# Patient Record
Sex: FEMALE | Race: WHITE | NOT HISPANIC OR LATINO | Employment: UNEMPLOYED | URBAN - METROPOLITAN AREA
[De-identification: names, ages, dates, MRNs, and addresses within clinical notes are randomized per-mention and may not be internally consistent; named-entity substitution may affect disease eponyms.]

---

## 2023-02-17 PROBLEM — M62.81 MUSCLE WEAKNESS OF EXTREMITY: Status: ACTIVE | Noted: 2023-02-17

## 2023-02-17 PROBLEM — J40 BRONCHITIS: Status: ACTIVE | Noted: 2023-02-17

## 2023-02-17 PROBLEM — M25.50 ARTHRALGIA: Status: ACTIVE | Noted: 2023-02-17

## 2023-02-17 PROBLEM — M54.50 CHRONIC LOW BACK PAIN: Status: ACTIVE | Noted: 2023-02-17

## 2023-02-17 PROBLEM — M15.9 PRIMARY OSTEOARTHRITIS INVOLVING MULTIPLE JOINTS: Status: ACTIVE | Noted: 2023-02-17

## 2023-02-17 PROBLEM — M25.579 ANKLE PAIN: Status: ACTIVE | Noted: 2023-02-17

## 2023-02-17 PROBLEM — C56.9 OVARIAN CANCER (MULTI): Status: ACTIVE | Noted: 2023-02-17

## 2023-02-17 PROBLEM — M79.606 LEG PAIN: Status: ACTIVE | Noted: 2023-02-17

## 2023-02-17 PROBLEM — R76.8 POSITIVE ANTINUCLEAR ANTIBODY: Status: ACTIVE | Noted: 2023-02-17

## 2023-02-17 PROBLEM — F11.90 CHRONIC, CONTINUOUS USE OF OPIOIDS: Status: ACTIVE | Noted: 2023-02-17

## 2023-02-17 PROBLEM — L98.9 BUMPS ON SKIN: Status: ACTIVE | Noted: 2023-02-17

## 2023-02-17 PROBLEM — E78.5 HYPERLIPIDEMIA: Status: ACTIVE | Noted: 2023-02-17

## 2023-02-17 PROBLEM — M15.0 PRIMARY OSTEOARTHRITIS INVOLVING MULTIPLE JOINTS: Status: ACTIVE | Noted: 2023-02-17

## 2023-02-17 PROBLEM — M79.671 PAIN OF BOTH HEELS: Status: ACTIVE | Noted: 2023-02-17

## 2023-02-17 PROBLEM — M79.641 PAIN IN BOTH HANDS: Status: ACTIVE | Noted: 2023-02-17

## 2023-02-17 PROBLEM — M77.01 MEDIAL EPICONDYLITIS OF RIGHT ELBOW: Status: ACTIVE | Noted: 2023-02-17

## 2023-02-17 PROBLEM — G89.29 CHRONIC LOW BACK PAIN: Status: ACTIVE | Noted: 2023-02-17

## 2023-02-17 PROBLEM — M79.645 PAIN OF LEFT THUMB: Status: ACTIVE | Noted: 2023-02-17

## 2023-02-17 PROBLEM — M79.642 PAIN IN BOTH HANDS: Status: ACTIVE | Noted: 2023-02-17

## 2023-02-17 PROBLEM — M25.531 RIGHT WRIST PAIN: Status: ACTIVE | Noted: 2023-02-17

## 2023-02-17 PROBLEM — M79.672 PAIN OF BOTH HEELS: Status: ACTIVE | Noted: 2023-02-17

## 2023-02-17 PROBLEM — M25.521 RIGHT ELBOW PAIN: Status: ACTIVE | Noted: 2023-02-17

## 2023-02-17 RX ORDER — EXEMESTANE 25 MG/1
TABLET ORAL
COMMUNITY

## 2023-02-17 RX ORDER — OXYCODONE HYDROCHLORIDE 10 MG/1
10 TABLET ORAL EVERY 8 HOURS PRN
COMMUNITY
Start: 2022-01-21 | End: 2023-03-09 | Stop reason: SDUPTHER

## 2023-02-17 RX ORDER — GABAPENTIN 300 MG/1
300 CAPSULE ORAL
COMMUNITY
Start: 2022-01-21 | End: 2023-03-09 | Stop reason: SDUPTHER

## 2023-02-17 RX ORDER — ONDANSETRON HYDROCHLORIDE 8 MG/1
TABLET, FILM COATED ORAL
COMMUNITY

## 2023-02-17 RX ORDER — TRAZODONE HYDROCHLORIDE 50 MG/1
1-2 TABLET ORAL NIGHTLY
COMMUNITY
End: 2023-03-24 | Stop reason: SDUPTHER

## 2023-02-19 PROBLEM — F51.01 PRIMARY INSOMNIA: Status: ACTIVE | Noted: 2023-02-19

## 2023-03-08 ENCOUNTER — CLINICAL SUPPORT (OUTPATIENT)
Dept: PRIMARY CARE | Facility: CLINIC | Age: 53
End: 2023-03-08
Payer: COMMERCIAL

## 2023-03-08 DIAGNOSIS — G89.29 CHRONIC LOW BACK PAIN, UNSPECIFIED BACK PAIN LATERALITY, UNSPECIFIED WHETHER SCIATICA PRESENT: ICD-10-CM

## 2023-03-08 DIAGNOSIS — M79.606 PAIN OF LOWER EXTREMITY, UNSPECIFIED LATERALITY: ICD-10-CM

## 2023-03-08 DIAGNOSIS — M54.50 CHRONIC LOW BACK PAIN, UNSPECIFIED BACK PAIN LATERALITY, UNSPECIFIED WHETHER SCIATICA PRESENT: ICD-10-CM

## 2023-03-08 PROCEDURE — 80373 DRUG SCREENING TRAMADOL: CPT

## 2023-03-08 PROCEDURE — 80307 DRUG TEST PRSMV CHEM ANLYZR: CPT

## 2023-03-08 PROCEDURE — 80358 DRUG SCREENING METHADONE: CPT

## 2023-03-08 PROCEDURE — 80355 GABAPENTIN NON-BLOOD: CPT

## 2023-03-08 PROCEDURE — 80346 BENZODIAZEPINES1-12: CPT

## 2023-03-08 PROCEDURE — 80361 OPIATES 1 OR MORE: CPT

## 2023-03-08 PROCEDURE — 80354 DRUG SCREENING FENTANYL: CPT

## 2023-03-08 PROCEDURE — 80368 SEDATIVE HYPNOTICS: CPT

## 2023-03-08 PROCEDURE — 80365 DRUG SCREENING OXYCODONE: CPT

## 2023-03-08 RX ORDER — OXYCODONE HYDROCHLORIDE 10 MG/1
10 TABLET ORAL EVERY 8 HOURS PRN
Qty: 90 TABLET | Refills: 0 | Status: CANCELLED | OUTPATIENT
Start: 2023-03-08 | End: 2023-04-07

## 2023-03-09 DIAGNOSIS — M15.9 PRIMARY OSTEOARTHRITIS INVOLVING MULTIPLE JOINTS: ICD-10-CM

## 2023-03-09 RX ORDER — OXYCODONE HYDROCHLORIDE 10 MG/1
10 TABLET ORAL EVERY 8 HOURS PRN
Qty: 90 TABLET | Refills: 0 | Status: CANCELLED | OUTPATIENT
Start: 2023-03-09

## 2023-03-09 RX ORDER — GABAPENTIN 300 MG/1
CAPSULE ORAL
Qty: 90 CAPSULE | Refills: 0 | Status: SHIPPED | OUTPATIENT
Start: 2023-03-09 | End: 2023-06-09 | Stop reason: SDUPTHER

## 2023-03-09 NOTE — TELEPHONE ENCOUNTER
Rx Refill Request Telephone Encounter    Name: Charo Burgess  :  1970    Medication Name:  oxycodone   Dose (Optional):  10 mg  Quantity (Optional):  #90  Directions (Optional):  Take 1 tab by mouth every 8 hours prn    ALLERGIES:  codeine hives  LAST DRUG SCREEN:  yesterday 3/8/23  LAST MED CONTRACT:  yesterday 3/8/23    Specific Pharmacy location:  [facility]    Date of last appointment:  22  Date of next appointment:  none    Best number to reach patient:  [phone]    Pt is calling because she came in yesterday for UDS and CSA and states a rx was supposed to be sent over but there was a glitch in the system.   Wanted to know if that could be sent over today

## 2023-03-10 RX ORDER — OXYCODONE HYDROCHLORIDE 10 MG/1
10 TABLET ORAL EVERY 8 HOURS PRN
Qty: 90 TABLET | Refills: 0 | Status: SHIPPED | OUTPATIENT
Start: 2023-03-10 | End: 2023-04-10 | Stop reason: SDUPTHER

## 2023-03-13 LAB — GABAPENTIN,URINE: <5 UG/ML

## 2023-03-15 LAB
6-ACETYLMORPHINE: <25 NG/ML
7-AMINOCLONAZEPAM: <25 NG/ML
ALPHA-HYDROXYALPRAZOLAM: <25 NG/ML
ALPHA-HYDROXYMIDAZOLAM: <25 NG/ML
ALPRAZOLAM: <25 NG/ML
AMPHETAMINE (PRESENCE) IN URINE BY SCREEN METHOD: ABNORMAL
BARBITURATES PRESENCE IN URINE BY SCREEN METHOD: ABNORMAL
CANNABINOIDS IN URINE BY SCREEN METHOD: ABNORMAL
CHLORDIAZEPOXIDE: <25 NG/ML
CLONAZEPAM: <25 NG/ML
COCAINE (PRESENCE) IN URINE BY SCREEN METHOD: ABNORMAL
CODEINE: <50 NG/ML
CREATINE, URINE FOR DRUG: 19.4 MG/DL
DIAZEPAM: <25 NG/ML
DRUG SCREEN COMMENT URINE: ABNORMAL
EDDP: <25 NG/ML
FENTANYL CONFIRMATION, URINE: <2.5 NG/ML
HYDROCODONE: <25 NG/ML
HYDROMORPHONE: <25 NG/ML
LORAZEPAM: <25 NG/ML
METHADONE CONFIRMATION,URINE: <25 NG/ML
MIDAZOLAM: <25 NG/ML
MORPHINE URINE: <50 NG/ML
NORDIAZEPAM: <25 NG/ML
NORFENTANYL: <2.5 NG/ML
NORHYDROCODONE: <25 NG/ML
NOROXYCODONE: <25 NG/ML
O-DESMETHYLTRAMADOL: <50 NG/ML
OXAZEPAM: <25 NG/ML
OXYCODONE: <25 NG/ML
OXYMORPHONE: <25 NG/ML
PHENCYCLIDINE (PRESENCE) IN URINE BY SCREEN METHOD: ABNORMAL
SPECIFIC GRAVITY, URINE DRUG: 1
TEMAZEPAM: <25 NG/ML
TRAMADOL: <50 NG/ML
ZOLPIDEM METABOLITE (ZCA): <25 NG/ML
ZOLPIDEM: <25 NG/ML

## 2023-03-24 ENCOUNTER — OFFICE VISIT (OUTPATIENT)
Dept: PRIMARY CARE | Facility: CLINIC | Age: 53
End: 2023-03-24
Payer: COMMERCIAL

## 2023-03-24 VITALS
WEIGHT: 184.6 LBS | SYSTOLIC BLOOD PRESSURE: 120 MMHG | RESPIRATION RATE: 16 BRPM | OXYGEN SATURATION: 99 % | HEIGHT: 66 IN | DIASTOLIC BLOOD PRESSURE: 82 MMHG | BODY MASS INDEX: 29.67 KG/M2 | TEMPERATURE: 98.4 F | HEART RATE: 72 BPM

## 2023-03-24 DIAGNOSIS — C56.9 MALIGNANT NEOPLASM OF OVARY, UNSPECIFIED LATERALITY (MULTI): ICD-10-CM

## 2023-03-24 DIAGNOSIS — M54.32 SCIATICA OF LEFT SIDE: ICD-10-CM

## 2023-03-24 DIAGNOSIS — F51.01 PRIMARY INSOMNIA: ICD-10-CM

## 2023-03-24 DIAGNOSIS — C50.912 MALIGNANT NEOPLASM OF LEFT FEMALE BREAST, UNSPECIFIED ESTROGEN RECEPTOR STATUS, UNSPECIFIED SITE OF BREAST (MULTI): ICD-10-CM

## 2023-03-24 DIAGNOSIS — T14.8XXA NERVE DAMAGE: ICD-10-CM

## 2023-03-24 DIAGNOSIS — M15.9 PRIMARY OSTEOARTHRITIS INVOLVING MULTIPLE JOINTS: Primary | ICD-10-CM

## 2023-03-24 DIAGNOSIS — E78.49 OTHER HYPERLIPIDEMIA: ICD-10-CM

## 2023-03-24 PROBLEM — J40 BRONCHITIS: Status: RESOLVED | Noted: 2023-02-17 | Resolved: 2023-03-24

## 2023-03-24 PROCEDURE — 99213 OFFICE O/P EST LOW 20 MIN: CPT | Performed by: FAMILY MEDICINE

## 2023-03-24 RX ORDER — METHYLPREDNISOLONE 4 MG/1
TABLET ORAL
Qty: 21 TABLET | Refills: 1 | Status: SHIPPED | OUTPATIENT
Start: 2023-03-24 | End: 2023-11-01 | Stop reason: ALTCHOICE

## 2023-03-24 RX ORDER — TRAZODONE HYDROCHLORIDE 50 MG/1
50-100 TABLET ORAL NIGHTLY
Qty: 60 TABLET | Refills: 5 | Status: SHIPPED | OUTPATIENT
Start: 2023-03-24 | End: 2023-07-07 | Stop reason: SDUPTHER

## 2023-03-24 ASSESSMENT — PATIENT HEALTH QUESTIONNAIRE - PHQ9
1. LITTLE INTEREST OR PLEASURE IN DOING THINGS: NOT AT ALL
2. FEELING DOWN, DEPRESSED OR HOPELESS: NOT AT ALL
SUM OF ALL RESPONSES TO PHQ9 QUESTIONS 1 AND 2: 0

## 2023-03-24 NOTE — PROGRESS NOTES
"Subjective   Patient ID: Charo Burgess is a 52 y.o. female who presents for Osteoarthritis.    HPI  Patient presents today for a follow-up on Osteoarthritis. Is taking Oxycodone 10 MG. States she has no concerns with this medication. Rates the pain a 9/10 over the past 7 days. Reports that the medication gives 80% pain control/relief. OARRS reviewed today. Controlled substance contract signed 3-8-23. UDS 3-8-23.     Pain is in lower back radiating down into buttocks.     Still sees Specialist for Breast, and Ovarian Cancer.  Recently had a MRI with [facility].     Taking current medications which were reviewed.  Problem list discussed.    Overall doing well.  Eating okay.  Staying active.    Has no other new problem /question.    Review of Systems  Constitutional- No activity change. No appetite change.  Eyes- Denies vision changes.  Respiratory- No shortness of breath.  Cardiovascular- No palpitations. No chest pain.  GI- No nausea or vomiting. No diarrhea or constipation. Denies abdominal pain.  Musculoskeletal- Denies joint swelling. Positive radiating lower back pain.   Extremities- No edema.  Neurological- Denies headaches. Denies dizziness.  Skin- No rashes.  Psychiatric/Behavioral- Denies significant anxiety, or depressed mood.  Objective     /82   Pulse 72   Temp 36.9 °C (98.4 °F)   Resp 16   Ht 1.676 m (5' 6\")   Wt 83.7 kg (184 lb 9.6 oz)   LMP  (LMP Unknown)   SpO2 99%   BMI 29.80 kg/m²     Allergies   Allergen Reactions    Codeine Hives     Constitutional- Well-nourished. No distress  Head- unremarkable.  Eyes- PERRL. Conjunctiva normal.  Nose- Normal. No rhinorrhea noted.  Throat- Oropharynx is clear and moist.  Neck- Supple with no thyromegaly.  No significant cervical adenopathy noted.  Pulmonary/Chest- Breath sounds normal with normal effort.  No wheezing.  Heart- Regular rate and rhythm.  No murmur.  Abdomen- Soft and non-tender.  No masses noted.  Musculoskeletal- Normal ROM.  No significant " joint swelling.  Mild low back pain and upper buttock pain consistent with sciatica  Extremities- No edema.   Neurological- Alert.  No noted deficits.  Skin- Warm.  No rashes.  Psychiatric/Behavioral- Mood and affect normal.  Behavior normal.     Assessment/Plan   Problem List Items Addressed This Visit          Nervous    Primary insomnia controlled on meds    Relevant Medications    traZODone (Desyrel) 50 mg tablet       Musculoskeletal    Primary osteoarthritis involving multiple joints - Primary    Relevant Medications    methylPREDNISolone (Medrol Dospak) 4 mg tablets       Endocrine/Metabolic    Ovarian cancer (CMS/HCC) in remission       Other    Hyperlipidemia stable    Relevant Orders    CBC and Auto Differential    Comprehensive metabolic panel    Lipid panel     Other Visit Diagnoses       Nerve damage        Malignant neoplasm of left female breast, unspecified estrogen receptor status, unspecified site of breast (CMS/HCC)        Sciatica of left side        Relevant Medications    methylPREDNISolone (Medrol Dospak) 4 mg tablets          Long talk. Treatment options reviewed.     OA: continue Percocet 10/325 mg, continue.     Lower back pain: Prescribed steroid pack, sent to pharmacy.     Breast/ovarian cancer: Continue to F/U with all specialists as scheduled.     Continue and take your medications as prescribed.    Health Maintenance issues discussed.    Importance of healthy diet and regular exercise regimen discussed.    We will contact you with any test results ordered. If you do not hear from us, please contact.    Follow-up as instructed or sooner if any problems or symptoms do not resolve as expected.     Scribe Attestation  By signing my name below, Anup LEGER, Scrgaye   attest that this documentation has been prepared under the direction and in the presence of Sanjiv Dobson MD.

## 2023-03-31 ENCOUNTER — APPOINTMENT (OUTPATIENT)
Dept: PRIMARY CARE | Facility: CLINIC | Age: 53
End: 2023-03-31
Payer: COMMERCIAL

## 2023-04-10 DIAGNOSIS — M15.9 PRIMARY OSTEOARTHRITIS INVOLVING MULTIPLE JOINTS: ICD-10-CM

## 2023-04-10 RX ORDER — OXYCODONE HYDROCHLORIDE 10 MG/1
10 TABLET ORAL EVERY 8 HOURS PRN
Qty: 90 TABLET | Refills: 0 | Status: SHIPPED | OUTPATIENT
Start: 2023-04-10 | End: 2023-05-09 | Stop reason: SDUPTHER

## 2023-04-10 NOTE — TELEPHONE ENCOUNTER
Pt is calling because she is OUT of her med    Rx Refill Request Telephone Encounter    Name: Charo Burgess  :  1970    Medication Name:   oxycodone   Dose (Optional):   10mg  Quantity (Optional):   #90  Directions (Optional):   take 1 tab every 8 hours prn    ALLERGIES:    codeine    LAST DRUG SCREEN:     3/8/23  LAST MED CONTRACT:    3/8/23    Specific Pharmacy location:    [facility]    Date of last appointment:    3/24/23  Date of next appointment:    23    Best number to reach patient:    [phone]

## 2023-05-09 DIAGNOSIS — M15.9 PRIMARY OSTEOARTHRITIS INVOLVING MULTIPLE JOINTS: ICD-10-CM

## 2023-05-09 RX ORDER — OXYCODONE HYDROCHLORIDE 10 MG/1
10 TABLET ORAL EVERY 8 HOURS PRN
Qty: 90 TABLET | Refills: 0 | Status: SHIPPED | OUTPATIENT
Start: 2023-05-09 | End: 2023-06-09 | Stop reason: SDUPTHER

## 2023-05-09 NOTE — TELEPHONE ENCOUNTER
PATIENT IS REQUESTING THE FOLLOWING MEDICATIONS:      NAME:OXYCODONE (Roxicodone)   DOSE: 10 MG IMMEDIATE RELEASE   DIRECTIONS: Take 1 tablet (10 mg) by mouth every 8 hours if needed for moderate pain (4 - 6) or severe pain (7 - 10).  UDS: 3/8/23  CSA:3/8/23  PHARMACY: [id number] [address] [place] [address] [facility] [address]  Phone: [phone]  Fax: [phone]   ALLERGIES: CODEINE  PHONE NUMBER: [phone]

## 2023-05-11 ENCOUNTER — TELEMEDICINE (OUTPATIENT)
Dept: PRIMARY CARE | Facility: CLINIC | Age: 53
End: 2023-05-11
Payer: COMMERCIAL

## 2023-05-11 DIAGNOSIS — U07.1 COVID-19: Primary | ICD-10-CM

## 2023-05-11 PROCEDURE — 99213 OFFICE O/P EST LOW 20 MIN: CPT | Performed by: FAMILY MEDICINE

## 2023-05-11 RX ORDER — AZITHROMYCIN 250 MG/1
TABLET, FILM COATED ORAL
Qty: 6 TABLET | Refills: 0 | Status: SHIPPED | OUTPATIENT
Start: 2023-05-11 | End: 2023-05-16

## 2023-05-11 NOTE — PROGRESS NOTES
Subjective   Patient ID: Charo Burgess is a 53 y.o. female who presents for Covid-19 Home Monitoring Phone Call.  HPI  Covid 19  Tested positive x 1 day ago  Symptoms started x 3 days ago  Admits to Productive (Green/yellow) cough  Headache,fever (100.1 F)  Body aches sinus and chest congestion   Decreased appetite   Has taken Tylenol, Mucinex and Motrin  Patient has had 1 covid vaccine  Has not had flu vaccine       Had breast cancer in the past and also 8 months out of ovarian cancer    Feeling achy headachy but having no troubles breathing at this time      Still smoking AGAINST MEDICAL ADVICE but not since she got COVID      Has been drinking fluids also              Review of systems  ; Patient seen today for exam denies any problems with headaches or vision, denies any shortness of breath chest pain nausea or vomiting, no black stool no blood in the stool no heartburn type symptoms denies any problems with constipation or diarrhea, and no dysuria-type symptoms    The patient's allergies medications were reviewed with them today    The patient's social family and surgical history or also reviewed here today, along with her past medical history.     Objective     Alert and active in  no acute distress  No exam done as this was a phone call visit and virtual    LMP  (LMP Unknown)     Allergies   Allergen Reactions    Codeine Hives       Assessment/Plan   Problem List Items Addressed This Visit    None  Visit Diagnoses       COVID-19    -  Primary    Relevant Medications    nirmatrelvir-ritonavir (PAXLOVID) 300 mg (150 mg x 2)-100 mg tablet therapy pack    azithromycin (Zithromax) 250 mg tablet          She understands risk benefits of medication we discussed with her if she worsens anyway she will to hospital at once any trouble breathing as she is a smoker higher incidence of relapse we discussed increasing her fluids alternate Tylenol and Advil let us know if things worsen this time we will hold off on a  steroid pack will call us tomorrow if that would change    If anything worsens or changes please call us at once, follow up in the office as planned,

## 2023-05-24 DIAGNOSIS — E78.49 OTHER HYPERLIPIDEMIA: ICD-10-CM

## 2023-05-24 RX ORDER — ATORVASTATIN CALCIUM 10 MG/1
TABLET, FILM COATED ORAL
Qty: 90 TABLET | Refills: 0 | Status: SHIPPED | OUTPATIENT
Start: 2023-05-24

## 2023-06-09 DIAGNOSIS — M15.9 PRIMARY OSTEOARTHRITIS INVOLVING MULTIPLE JOINTS: ICD-10-CM

## 2023-06-09 DIAGNOSIS — M79.606 PAIN OF LOWER EXTREMITY, UNSPECIFIED LATERALITY: ICD-10-CM

## 2023-06-09 RX ORDER — GABAPENTIN 300 MG/1
CAPSULE ORAL
Qty: 90 CAPSULE | Refills: 0 | Status: SHIPPED | OUTPATIENT
Start: 2023-06-09 | End: 2023-09-06 | Stop reason: SDUPTHER

## 2023-06-09 RX ORDER — OXYCODONE HYDROCHLORIDE 10 MG/1
10 TABLET ORAL EVERY 8 HOURS PRN
Qty: 90 TABLET | Refills: 0 | Status: SHIPPED | OUTPATIENT
Start: 2023-06-09 | End: 2023-07-07 | Stop reason: SDUPTHER

## 2023-06-09 NOTE — TELEPHONE ENCOUNTER
Rx Refill Request Telephone Encounter    Name: Charo Burgess  :  1970    Medication Name:   OXYCODONE  Dose (Optional):   10 MG  Quantity (Optional):   90  Directions (Optional):   1 TABLET EVERY 8 HOURS PRN FOR PAIN    ALLERGIES:    ON FILE    LAST DRUG SCREEN:     3/8/23  LAST MED CONTRACT:    3/8/23    Specific Pharmacy location:    [facility] [place] [facility]    Date of last appointment:    23  Date of next appointment:    23    Best number to reach patient:    [phone]    ALSO NEEDS:    GABAPENTIN 300 MG - 2 OR 3 TIMES PER DAY #90

## 2023-07-07 DIAGNOSIS — F51.01 PRIMARY INSOMNIA: ICD-10-CM

## 2023-07-07 DIAGNOSIS — M15.9 PRIMARY OSTEOARTHRITIS INVOLVING MULTIPLE JOINTS: ICD-10-CM

## 2023-07-07 RX ORDER — OXYCODONE HYDROCHLORIDE 10 MG/1
10 TABLET ORAL EVERY 8 HOURS PRN
Qty: 90 TABLET | Refills: 0 | Status: SHIPPED | OUTPATIENT
Start: 2023-07-07 | End: 2023-08-07 | Stop reason: SDUPTHER

## 2023-07-07 RX ORDER — TRAZODONE HYDROCHLORIDE 50 MG/1
50-100 TABLET ORAL NIGHTLY
Qty: 60 TABLET | Refills: 0 | Status: SHIPPED | OUTPATIENT
Start: 2023-07-07 | End: 2023-08-07 | Stop reason: SDUPTHER

## 2023-07-07 NOTE — TELEPHONE ENCOUNTER
Rx Refill Request Telephone Encounter    Name: Charo Burgess  :  1970    Medication Name:   OXYCODONE IMMEDIATE RELEASE  Dose (Optional):   10 MR  Quantity (Optional):     Directions (Optional):   1 TABLET EVERY 8 HOURS PRN FOR PAIN    Medication Name: TRAZODONE  Dose: 50 MG  Quantity:  Directions: 1 - 2 TABLETS AT BEDTIME    ALLERGIES:    ON FILE    LAST DRUG SCREEN:     3/8/23  LAST MED CONTRACT:    3/8/23    Specific Pharmacy location:    [facility] [place] [facility]    Date of last appointment:    23  Date of next appointment:    23    Best number to reach patient:    [phone]    NEEDS SHORT SUPPLY TO GET HER THROUGH UNTIL APPT

## 2023-07-24 ENCOUNTER — OFFICE VISIT (OUTPATIENT)
Dept: PRIMARY CARE | Facility: CLINIC | Age: 53
End: 2023-07-24
Payer: COMMERCIAL

## 2023-07-24 VITALS
WEIGHT: 173 LBS | BODY MASS INDEX: 27.8 KG/M2 | OXYGEN SATURATION: 98 % | SYSTOLIC BLOOD PRESSURE: 120 MMHG | TEMPERATURE: 98.1 F | DIASTOLIC BLOOD PRESSURE: 80 MMHG | RESPIRATION RATE: 18 BRPM | HEIGHT: 66 IN | HEART RATE: 70 BPM

## 2023-07-24 DIAGNOSIS — F51.01 PRIMARY INSOMNIA: ICD-10-CM

## 2023-07-24 DIAGNOSIS — M15.9 PRIMARY OSTEOARTHRITIS INVOLVING MULTIPLE JOINTS: ICD-10-CM

## 2023-07-24 DIAGNOSIS — E78.49 OTHER HYPERLIPIDEMIA: ICD-10-CM

## 2023-07-24 DIAGNOSIS — M79.606 PAIN OF LOWER EXTREMITY, UNSPECIFIED LATERALITY: ICD-10-CM

## 2023-07-24 DIAGNOSIS — T14.8XXA NERVE DAMAGE: ICD-10-CM

## 2023-07-24 PROCEDURE — 99213 OFFICE O/P EST LOW 20 MIN: CPT | Performed by: FAMILY MEDICINE

## 2023-07-24 NOTE — PROGRESS NOTES
"Subjective   Patient ID: Charo Burgess is a 53 y.o. female who presents for Osteoarthritis, Hyperlipidemia, and Insomnia.  The patient is a 53 year-old female presenting to the clinic to discuss osteoarthritis, hyperlipidemia, and insomnia.  She reports taking all her medications without fault.  She presents today with complaints of osteoarthritis pain.  She reports taking oxycodone for relief.            Patient presents for osteoarthritis. Is currently taking oxycodone. Rates the pain a 9/10 over the past 7 days. Reports that the medication gives 80% pain control/relief. OARRS reviewed today, CSA 3-8-23. Last took today.    Hyperlipidemia follow up  Denies chest pain,SOB, swelling, headaches, lightheadedness or dizziness.   Eats a generally healthy diet, Exercises.   Does check BP at home.   Currently taking atorvastatin    Patient in office being seen for a follow up on Insomnia. Currently taking trazodone. Last took last night. Medication agreement signed on 3-8-23. Reports that the medication is working 10/10. 100% managed with the medication. States there are no adverse effects.     ROS  Constitutional- No activity change. No appetite change.  Eyes- Denies vision changes.  Respiratory- No shortness of breath.  Cardiovascular- No palpitations. No chest pain.  GI- No nausea or vomiting. No diarrhea or constipation. Denies abdominal pain.  Musculoskeletal- Denies joint swelling.  Extremities- No edema.  Neurological- Denies headaches. Denies dizziness.  Skin- No rashes.  Psychiatric/Behavioral- Denies significant anxiety, or depressed mood.     Objective     /80   Pulse 70   Temp 36.7 °C (98.1 °F) (Temporal)   Resp 18   Ht 1.676 m (5' 6\")   Wt 78.5 kg (173 lb)   LMP  (LMP Unknown)   SpO2 98%   BMI 27.92 kg/m²     Allergies   Allergen Reactions    Codeine Hives       Constitutional-- Well-nourished.  No distress  Head- unremarkable.  Eyes- PERRL.  Conjunctiva normal.  Nose- Normal.  No rhinorrhea " noted.  Throat- Oropharynx is clear and moist.  Neck- Supple with no thyromegaly.  No significant cervical adenopathy noted.  Pulmonary/Chest- Breath sounds normal with normal effort.  No wheezing.  Heart- Regular rate and rhythm.  No murmur.  Abdomen- Soft and non-tender.  No masses noted.  Musculoskeletal-chronic low back pain exacerbated with range of motion.  Extremities- No edema.   Neurological- Alert.  No noted deficits.  Skin- Warm.  No rashes.    Assessment/Plan   1. Other hyperlipidemia        2. Primary osteoarthritis involving multiple joints        3. Primary insomnia        4. Nerve damage        5. Pain of lower extremity, unspecified laterality          Hyperlipidemia managed.    Osteoarthritis controlled.        Long talk. Treatment options reviewed.    Continue and take your medications as prescribed.    Health Maintenance issues discussed.    Importance of healthy diet and regular exercise regimen discussed.    We will contact you with any test results ordered. If you do not hear from us, please contact.    Follow-up as instructed or sooner if any problems or symptoms do not resolve as expected.    Scribe Attestation  By signing my name below, IKatelin Scribe   attest that this documentation has been prepared under the direction and in the presence of Sanjiv Dobson MD.

## 2023-08-07 DIAGNOSIS — F51.01 PRIMARY INSOMNIA: ICD-10-CM

## 2023-08-07 DIAGNOSIS — M15.9 PRIMARY OSTEOARTHRITIS INVOLVING MULTIPLE JOINTS: ICD-10-CM

## 2023-08-07 RX ORDER — TRAZODONE HYDROCHLORIDE 50 MG/1
50-100 TABLET ORAL NIGHTLY
Qty: 60 TABLET | Refills: 0 | Status: SHIPPED | OUTPATIENT
Start: 2023-08-07 | End: 2023-09-12 | Stop reason: SDUPTHER

## 2023-08-07 RX ORDER — OXYCODONE HYDROCHLORIDE 10 MG/1
10 TABLET ORAL EVERY 8 HOURS PRN
Qty: 90 TABLET | Refills: 0 | Status: SHIPPED | OUTPATIENT
Start: 2023-08-07 | End: 2023-08-09 | Stop reason: SDUPTHER

## 2023-08-07 NOTE — TELEPHONE ENCOUNTER
Rx Refill Request Telephone Encounter    Name: Charo Burgess  :  1970    Medication Name:   OXYCODONE  Dose (Optional):   10 MG  Quantity (Optional):   90  Directions (Optional):   1 EVERY 8 HOURS    Medication Name:   TRAZODONE  Dose (Optional):   50 MG  Quantity (Optional):   60  Directions (Optional):   1-2 AT BEDTIME    ALLERGIES:    SEE LIST    LAST DRUG SCREEN:     2023  LAST MED CONTRACT:    2023    Specific Pharmacy location:    [facility]     Date of last appointment:    2023  Date of next appointment:    NONE    Best number to reach patient:    [phone]

## 2023-08-09 DIAGNOSIS — M15.9 PRIMARY OSTEOARTHRITIS INVOLVING MULTIPLE JOINTS: ICD-10-CM

## 2023-08-09 RX ORDER — OXYCODONE HYDROCHLORIDE 10 MG/1
10 TABLET ORAL EVERY 8 HOURS PRN
Qty: 90 TABLET | Refills: 0 | Status: SHIPPED | OUTPATIENT
Start: 2023-08-09 | End: 2023-09-06 | Stop reason: SDUPTHER

## 2023-08-09 NOTE — TELEPHONE ENCOUNTER
Rx Refill Request Telephone Encounter/ PATIENT IS REQUESTING THAT IF THIS COULD BE RESENT TO [address] OTHER PHARMACY IS OUT OF STOCK/ PATIENT IS COMPLETELY OUT OF THE MEDICATION     Name: Charo Burgess  :  1970    Medication Name:   OXYCODONE (ROXICODONE)  Dose (Optional):   10 MG   Quantity (Optional):   90 TABLETS   Directions (Optional):   TAKE 1 TABLET BY MOUTH EVERY 8 HRS IF NEEDED FOR MODERATE PAIN (4-6)  OR SEVERE PAIN (7-10)    ALLERGIES:    ON FILE     LAST DRUG SCREEN:     3/8/2023  LAST MED CONTRACT:    3/8/2023    Specific Pharmacy location:    [address]( THEY HAVE IT IN STOCK)    Date of last appointment:    23  Date of next appointment:    NOT SCHEDULED     Best number to reach patient:    [phone]

## 2023-09-06 DIAGNOSIS — M15.9 PRIMARY OSTEOARTHRITIS INVOLVING MULTIPLE JOINTS: ICD-10-CM

## 2023-09-06 DIAGNOSIS — M79.606 PAIN OF LOWER EXTREMITY, UNSPECIFIED LATERALITY: ICD-10-CM

## 2023-09-06 RX ORDER — OXYCODONE HYDROCHLORIDE 10 MG/1
10 TABLET ORAL EVERY 8 HOURS PRN
Qty: 90 TABLET | Refills: 0 | Status: SHIPPED | OUTPATIENT
Start: 2023-09-06 | End: 2023-10-06 | Stop reason: SDUPTHER

## 2023-09-06 RX ORDER — GABAPENTIN 300 MG/1
CAPSULE ORAL
Qty: 90 CAPSULE | Refills: 0 | Status: SHIPPED | OUTPATIENT
Start: 2023-09-06 | End: 2023-11-09 | Stop reason: SDUPTHER

## 2023-09-06 NOTE — TELEPHONE ENCOUNTER
Rx Refill Request Telephone Encounter    Name: Charo Burgess  :  1970    Medication Name:   Gabapentin 300 mg  Quantity (Optional):   90 Refill: 1  Directions (Optional):   2-3 times daily.     Medication Name:   Oxycodone 10 mg  Quantity (Optional):   90  Directions (Optional):   Take 1 tablet (10 mg) by mouth every 8 hours if needed for moderate pain (4 - 6) or severe pain (7 - 10).     LAST DRUG SCREEN:     3/23  LAST MED CONTRACT:    3/23    Specific Pharmacy location:    [facility] [address]    Date of last appointment:    23

## 2023-09-12 DIAGNOSIS — F51.01 PRIMARY INSOMNIA: ICD-10-CM

## 2023-09-12 RX ORDER — TRAZODONE HYDROCHLORIDE 50 MG/1
50-100 TABLET ORAL NIGHTLY
Qty: 60 TABLET | Refills: 2 | Status: SHIPPED | OUTPATIENT
Start: 2023-09-12 | End: 2024-02-02 | Stop reason: ALTCHOICE

## 2023-10-06 DIAGNOSIS — M15.9 PRIMARY OSTEOARTHRITIS INVOLVING MULTIPLE JOINTS: ICD-10-CM

## 2023-10-06 RX ORDER — OXYCODONE HYDROCHLORIDE 10 MG/1
10 TABLET ORAL EVERY 8 HOURS PRN
Qty: 90 TABLET | Refills: 0 | Status: SHIPPED | OUTPATIENT
Start: 2023-10-06 | End: 2023-11-06 | Stop reason: SDUPTHER

## 2023-10-06 NOTE — TELEPHONE ENCOUNTER
Rx Controlled Refill Request Telephone Encounter    Name: Charo Burgess  :  1970    Medication Name:   OXYCODONE  Dose (Optional):   10 MG  Quantity (Optional):   90  Directions (Optional):   1 TABLET EVERY 8 HOURS PRN PAIN    ALLERGIES:    ON FILE    LAST DRUG SCREEN:     3/8/23  LAST MED CONTRACT:    3/8/23    Specific Pharmacy location:    [facility]    Date of last appointment:    23  Date of next appointment:    10/23/23    Best number to reach patient:    [phone]

## 2023-10-20 ENCOUNTER — TELEPHONE (OUTPATIENT)
Dept: PRIMARY CARE | Facility: CLINIC | Age: 53
End: 2023-10-20
Payer: COMMERCIAL

## 2023-10-20 NOTE — TELEPHONE ENCOUNTER
Pt calling, she states that she has shingles all over her neck, shoulder and elbow. She has an appointment with you on Monday for her 3 month med refill check up. She wanted to know if she should come into the office with so many symptoms?    Please advise

## 2023-10-20 NOTE — TELEPHONE ENCOUNTER
Patient aware. Admits that she was diagnosed on 1010/23 at the ER, she was prescribed Flexeril, Valtrex, and Naprosyn.

## 2023-10-31 NOTE — PROGRESS NOTES
"Subjective   Patient ID: Charo Burgess is a 53 y.o. female who presents for Herpes Zoster.    HPI    Patient presents today for shingles follow up.  Located left side neck, shoulder, ear, arms  She is currently on medications for this.   Was seen at [facility] ER on 10/10/23  Admits that she is still experiencing headaches, and body aches on the left side   Has been using a heating pad.  States she is up and down at night, does use Trazodone.    No other concern    Review of systems  ; Patient seen today for exam denies any problems with headaches or vision, denies any shortness of breath chest pain nausea or vomiting, no black stool no blood in the stool no heartburn type symptoms denies any problems with constipation or diarrhea, and no dysuria-type symptoms    The patient's allergies medications were reviewed with them today    The patient's social family and surgical history or also reviewed here today, along with her past medical history.     Objective     Alert and active in  no acute distress  HEENT TMs clear oropharynx negative nares clear no drainage noted neck supple  With no adenopathy   Heart regular rate and rhythm without murmur and no carotid bruits  Lungs- clear to auscultation bilaterally, no wheeze or rhonchi noted  Thyroid -negative masses or nodularity  Abdomen- soft times four quadrants , bowel sounds positive no masses or organomegaly, negative tenderness guarding or rebound  Neurological exam unremarkable- DTRs in upper and lower extremities within normal limits.   skin -healing areas to the left neck slightly sensitive to touch no obvious sores noted    /80 (BP Location: Right arm, Patient Position: Sitting, BP Cuff Size: Adult)   Pulse 72   Temp 36.3 °C (97.4 °F) (Temporal)   Resp 16   Ht 1.676 m (5' 6\")   Wt 79.4 kg (175 lb)   LMP  (LMP Unknown)   SpO2 97%   BMI 28.25 kg/m²     Allergies   Allergen Reactions    Codeine Hives       Assessment/Plan   Problem List " Items Addressed This Visit       Ovarian cancer (CMS/HCC)     Other Visit Diagnoses       Herpes zoster with complication        Relevant Medications    methylPREDNISolone (Medrol Dospak) 4 mg tablets    Facial lesion        Relevant Orders    Referral to Dermatology          She should not get vaccine for 6 months-1 year.     Medrol dosepak prescribed today.    Referral to dermatology ordered.    If anything worsens or changes please call us at once, follow up in the office as planned.    Scribe Attestation  By signing my name below, I, Perla Lloyd MA, Scribe   attest that this documentation has been prepared under the direction and in the presence of Davdi Chase DO.

## 2023-11-01 ENCOUNTER — OFFICE VISIT (OUTPATIENT)
Dept: PRIMARY CARE | Facility: CLINIC | Age: 53
End: 2023-11-01
Payer: COMMERCIAL

## 2023-11-01 VITALS
OXYGEN SATURATION: 97 % | RESPIRATION RATE: 16 BRPM | HEIGHT: 66 IN | WEIGHT: 175 LBS | TEMPERATURE: 97.4 F | HEART RATE: 72 BPM | DIASTOLIC BLOOD PRESSURE: 80 MMHG | SYSTOLIC BLOOD PRESSURE: 118 MMHG | BODY MASS INDEX: 28.12 KG/M2

## 2023-11-01 DIAGNOSIS — L98.9 FACIAL LESION: ICD-10-CM

## 2023-11-01 DIAGNOSIS — B02.8 HERPES ZOSTER WITH COMPLICATION: ICD-10-CM

## 2023-11-01 DIAGNOSIS — C56.9 MALIGNANT NEOPLASM OF OVARY, UNSPECIFIED LATERALITY (MULTI): ICD-10-CM

## 2023-11-01 PROCEDURE — 99213 OFFICE O/P EST LOW 20 MIN: CPT | Performed by: FAMILY MEDICINE

## 2023-11-01 RX ORDER — METHYLPREDNISOLONE 4 MG/1
TABLET ORAL
Qty: 21 TABLET | Refills: 0 | Status: SHIPPED | OUTPATIENT
Start: 2023-11-01 | End: 2023-11-08

## 2023-11-01 RX ORDER — VALACYCLOVIR HYDROCHLORIDE 1 G/1
1000 TABLET, FILM COATED ORAL 2 TIMES DAILY
COMMUNITY
Start: 2023-10-10

## 2023-11-06 DIAGNOSIS — M15.9 PRIMARY OSTEOARTHRITIS INVOLVING MULTIPLE JOINTS: ICD-10-CM

## 2023-11-06 RX ORDER — OXYCODONE HYDROCHLORIDE 10 MG/1
10 TABLET ORAL EVERY 8 HOURS PRN
Qty: 90 TABLET | Refills: 0 | Status: SHIPPED | OUTPATIENT
Start: 2023-11-06 | End: 2023-12-06 | Stop reason: SDUPTHER

## 2023-11-06 NOTE — TELEPHONE ENCOUNTER
Rx Refill Request Telephone Encounter    Name:  Charo Burgess  :  [id number]  Medication Name:  oxycodone   Dose : 10 mg  Route : oral  Frequency : every 8 hrs PRN for moderate pain (4-6) severe pain (7-10)  Quantity : 90 tablet  Frequency : Every 8 hrs PRN for moderate pain (4-6) severe pain (7-10)    ALLERGIES: On File    Specific Pharmacy location:  [id number]  Date of last appointment:  2023  Date of next appointment:  2024  Best number to reach patient:  [phone]

## 2023-11-09 DIAGNOSIS — M79.606 PAIN OF LOWER EXTREMITY, UNSPECIFIED LATERALITY: ICD-10-CM

## 2023-11-09 RX ORDER — GABAPENTIN 300 MG/1
CAPSULE ORAL
Qty: 90 CAPSULE | Refills: 2 | Status: SHIPPED | OUTPATIENT
Start: 2023-11-09 | End: 2023-12-26 | Stop reason: SDUPTHER

## 2023-12-05 DIAGNOSIS — M15.9 PRIMARY OSTEOARTHRITIS INVOLVING MULTIPLE JOINTS: ICD-10-CM

## 2023-12-05 NOTE — TELEPHONE ENCOUNTER
Rx Controlled Refill Request Telephone Encounter    Name: Charo Burgess  :  1970    Medication Name:   Oxycodone  Dose (Optional):   10mg  Quantity (Optional):   #90  Directions (Optional):   Take 1 tablet (10mg) by mouth every 8 hours prn for moderate pain (4-6) or severe pain (7-10)    ALLERGIES:    see list     LAST DRUG SCREEN:     3/8/23  LAST MED CONTRACT:    3/8/23    Specific Pharmacy location:    [facility]    Date of last appointment:    23  Date of next appointment:    24    Best number to reach patient:    [phone]

## 2023-12-05 NOTE — TELEPHONE ENCOUNTER
Has not been seen for RX since July. Needs appointment with PCP. Please call to schedule, and let us know if a short supply is needed. Thank you!

## 2023-12-05 NOTE — TELEPHONE ENCOUNTER
Lmom for pt to rtc to set up appointment for refills as last appointment for RX was in July and if need short supply request that after she makes appointment  [phone]

## 2023-12-06 RX ORDER — OXYCODONE HYDROCHLORIDE 10 MG/1
10 TABLET ORAL EVERY 8 HOURS PRN
Qty: 60 TABLET | Refills: 0 | Status: SHIPPED | OUTPATIENT
Start: 2023-12-06 | End: 2023-12-26 | Stop reason: SDUPTHER

## 2023-12-19 NOTE — PROGRESS NOTES
"Subjective   Patient ID: Charo Burgess is a 53 y.o. female who presents for Pain and Insomnia.  HPI    Patient presents for chronic pain. Is currently taking oxycodone and gabapentin. Rates the pain a 7/10 over the past 7 days. Reports that the medication gives 80% pain control/relief. OARRS reviewed today, CSA signed. Last took last night. She would like to discuss possibly increasing the dose of Gabapentin. She believes the chemo is what caused the pain.    She has been having left knee pain.  Ongoing x 1 month. Denies any injury.  She states she has had pain in left hip and leg for a while, but her knee is giving out on her. She is not sure if she should see a specialist    Patient in office being seen for a follow up on Insomnia. Currently taking trazodone. Last took last night. 50% managed with the medication. States there are no adverse effects.     Taking current medications which were reviewed.  Problem list discussed.    Overall doing well.  Eating okay.  Staying active.    Has no other new problem /question.     ROS  Constitutional- No activity change. No appetite change.  Eyes- Denies vision changes.  Respiratory- No shortness of breath.  Cardiovascular- No palpitations. No chest pain.  GI- No nausea or vomiting. No diarrhea or constipation. Denies abdominal pain.  Musculoskeletal- myalgias  Extremities- No edema.  Neurological- Denies headaches. Denies dizziness.  Skin- No rashes.  Psychiatric/Behavioral- Denies significant anxiety, or depressed mood.     Objective     /82 (BP Location: Left arm, Patient Position: Sitting, BP Cuff Size: Adult)   Pulse 84   Temp 36.6 °C (97.9 °F) (Temporal)   Resp 18   Ht 1.676 m (5' 6\")   Wt 80.2 kg (176 lb 12.8 oz)   LMP  (LMP Unknown)   SpO2 96%   BMI 28.54 kg/m²     Allergies   Allergen Reactions    Codeine Hives       Constitutional-- Well-nourished.  No distress  Head- unremarkable.  Eyes- PERRL.  Conjunctiva normal.  Nose- Normal.  No rhinorrhea " noted.  Throat- Oropharynx is clear and moist.  Neck- Supple with no thyromegaly.  No significant cervical adenopathy noted.  Pulmonary/Chest- Breath sounds normal with normal effort.  No wheezing.  Heart- Regular rate and rhythm.  No murmur.  Abdomen- Soft and non-tender.  No masses noted.  Musculoskeletal- Normal ROM.  Mild knee pain with walking anteriorly.  Ligaments intact.  Drawer sign negative.  Extremities- No edema.   Neurological- Alert.  No noted deficits.  Skin- Warm.  No rashes.  Psychiatric/Behavioral- Mood and affect normal.  Behavior normal.     Assessment/Plan   1. Primary osteoarthritis involving multiple joints  oxyCODONE (Roxicodone) 10 mg immediate release tablet    methylPREDNISolone (Medrol Dospak) 4 mg tablets      2. Primary insomnia        3. Other hyperlipidemia        4. Acute pain of left knee  methylPREDNISolone (Medrol Dospak) 4 mg tablets      5. Pain of lower extremity, unspecified laterality  gabapentin (Neurontin) 300 mg capsule             Long talk. Treatment options reviewed.    Continue current medication. Educated on insomnia and sleep hygiene.    Discussed knee pain.  Suspect arthritis flareup.  Medrol Dosepak as directed.  Continue to monitor.  Call for orthopedic referral and/or x-ray if symptoms do not resolve     Osteoarthritis controlled. Educated the patient on osteoarthritis care and management. Educated on muscle strength and exercise.  Understands to use least amount of pain medication control her symptoms.    Given her chronic pain we will increase gabapentin to 4 times daily    Continue and take your medications as prescribed.    Health Maintenance issues discussed.    Importance of healthy diet and regular exercise regimen discussed.    We will contact you with any test results ordered. If you do not hear from us, please contact.    Follow-up as instructed or sooner if any problems or symptoms do not resolve as expected.      Scribe Attestation  By signing my name  below, I, Alfredo Thompson   attest that this documentation has been prepared under the direction and in the presence of Sanjiv Dobson MD.

## 2023-12-26 ENCOUNTER — OFFICE VISIT (OUTPATIENT)
Dept: PRIMARY CARE | Facility: CLINIC | Age: 53
End: 2023-12-26
Payer: COMMERCIAL

## 2023-12-26 VITALS
RESPIRATION RATE: 18 BRPM | WEIGHT: 176.8 LBS | HEART RATE: 84 BPM | OXYGEN SATURATION: 96 % | TEMPERATURE: 97.9 F | DIASTOLIC BLOOD PRESSURE: 82 MMHG | HEIGHT: 66 IN | BODY MASS INDEX: 28.42 KG/M2 | SYSTOLIC BLOOD PRESSURE: 112 MMHG

## 2023-12-26 DIAGNOSIS — M79.606 PAIN OF LOWER EXTREMITY, UNSPECIFIED LATERALITY: ICD-10-CM

## 2023-12-26 DIAGNOSIS — E78.49 OTHER HYPERLIPIDEMIA: ICD-10-CM

## 2023-12-26 DIAGNOSIS — F51.01 PRIMARY INSOMNIA: ICD-10-CM

## 2023-12-26 DIAGNOSIS — M15.9 PRIMARY OSTEOARTHRITIS INVOLVING MULTIPLE JOINTS: Primary | ICD-10-CM

## 2023-12-26 DIAGNOSIS — M25.562 ACUTE PAIN OF LEFT KNEE: ICD-10-CM

## 2023-12-26 PROCEDURE — 99213 OFFICE O/P EST LOW 20 MIN: CPT | Performed by: FAMILY MEDICINE

## 2023-12-26 RX ORDER — GABAPENTIN 300 MG/1
CAPSULE ORAL
Qty: 120 CAPSULE | Refills: 3 | Status: SHIPPED | OUTPATIENT
Start: 2023-12-26

## 2023-12-26 RX ORDER — METHYLPREDNISOLONE 4 MG/1
TABLET ORAL
Qty: 21 TABLET | Refills: 0 | Status: SHIPPED | OUTPATIENT
Start: 2023-12-26 | End: 2024-03-26 | Stop reason: ALTCHOICE

## 2023-12-26 RX ORDER — OXYCODONE HYDROCHLORIDE 10 MG/1
10 TABLET ORAL EVERY 8 HOURS PRN
Qty: 90 TABLET | Refills: 0 | Status: SHIPPED | OUTPATIENT
Start: 2023-12-26 | End: 2024-01-26 | Stop reason: SDUPTHER

## 2024-01-26 DIAGNOSIS — M15.9 PRIMARY OSTEOARTHRITIS INVOLVING MULTIPLE JOINTS: ICD-10-CM

## 2024-01-26 RX ORDER — OXYCODONE HYDROCHLORIDE 10 MG/1
10 TABLET ORAL EVERY 8 HOURS PRN
Qty: 90 TABLET | Refills: 0 | Status: SHIPPED | OUTPATIENT
Start: 2024-01-26 | End: 2024-02-22 | Stop reason: SDUPTHER

## 2024-01-26 NOTE — TELEPHONE ENCOUNTER
Rx Controlled Refill Request Telephone Encounter    Name: Charo Burgess  :  1970    Medication Name:   OXYCODONE  Dose (Optional):   10 MG  Quantity (Optional):   90  Directions (Optional):   1 TABLET EVERY 8 HOURS PRN FOR PAIN    ALLERGIES:    ON FILE    LAST DRUG SCREEN:     23  LAST MED CONTRACT:    23    Specific Pharmacy location:    [facility] ON     Date of last appointment:    23  Date of next appointment:    3/26/24    Best number to reach patient:    [phone]

## 2024-02-02 ENCOUNTER — OFFICE VISIT (OUTPATIENT)
Dept: PRIMARY CARE | Facility: CLINIC | Age: 54
End: 2024-02-02
Payer: COMMERCIAL

## 2024-02-02 VITALS
TEMPERATURE: 98.1 F | WEIGHT: 180.4 LBS | RESPIRATION RATE: 16 BRPM | BODY MASS INDEX: 28.99 KG/M2 | DIASTOLIC BLOOD PRESSURE: 66 MMHG | OXYGEN SATURATION: 96 % | HEIGHT: 66 IN | SYSTOLIC BLOOD PRESSURE: 120 MMHG | HEART RATE: 68 BPM

## 2024-02-02 DIAGNOSIS — J01.90 ACUTE NON-RECURRENT SINUSITIS, UNSPECIFIED LOCATION: ICD-10-CM

## 2024-02-02 DIAGNOSIS — C50.912 MALIGNANT NEOPLASM OF LEFT FEMALE BREAST, UNSPECIFIED ESTROGEN RECEPTOR STATUS, UNSPECIFIED SITE OF BREAST (MULTI): ICD-10-CM

## 2024-02-02 DIAGNOSIS — G89.29 CHRONIC LOW BACK PAIN, UNSPECIFIED BACK PAIN LATERALITY, UNSPECIFIED WHETHER SCIATICA PRESENT: ICD-10-CM

## 2024-02-02 DIAGNOSIS — J02.9 SORETHROAT: ICD-10-CM

## 2024-02-02 DIAGNOSIS — H92.02 LEFT EAR PAIN: ICD-10-CM

## 2024-02-02 DIAGNOSIS — Z51.81 THERAPEUTIC DRUG MONITORING: ICD-10-CM

## 2024-02-02 DIAGNOSIS — M79.606 PAIN OF LOWER EXTREMITY, UNSPECIFIED LATERALITY: ICD-10-CM

## 2024-02-02 DIAGNOSIS — E78.49 OTHER HYPERLIPIDEMIA: ICD-10-CM

## 2024-02-02 DIAGNOSIS — M54.50 CHRONIC LOW BACK PAIN, UNSPECIFIED BACK PAIN LATERALITY, UNSPECIFIED WHETHER SCIATICA PRESENT: ICD-10-CM

## 2024-02-02 DIAGNOSIS — C56.9 MALIGNANT NEOPLASM OF OVARY, UNSPECIFIED LATERALITY (MULTI): ICD-10-CM

## 2024-02-02 DIAGNOSIS — F51.01 PRIMARY INSOMNIA: Primary | ICD-10-CM

## 2024-02-02 DIAGNOSIS — F11.20 CONTINUOUS OPIOID DEPENDENCE (MULTI): ICD-10-CM

## 2024-02-02 DIAGNOSIS — M15.9 PRIMARY OSTEOARTHRITIS INVOLVING MULTIPLE JOINTS: ICD-10-CM

## 2024-02-02 PROCEDURE — 80361 OPIATES 1 OR MORE: CPT

## 2024-02-02 PROCEDURE — 82570 ASSAY OF URINE CREATININE: CPT

## 2024-02-02 PROCEDURE — 99213 OFFICE O/P EST LOW 20 MIN: CPT | Performed by: FAMILY MEDICINE

## 2024-02-02 PROCEDURE — 1036F TOBACCO NON-USER: CPT | Performed by: FAMILY MEDICINE

## 2024-02-02 PROCEDURE — 80368 SEDATIVE HYPNOTICS: CPT

## 2024-02-02 PROCEDURE — 80358 DRUG SCREENING METHADONE: CPT

## 2024-02-02 PROCEDURE — 80365 DRUG SCREENING OXYCODONE: CPT

## 2024-02-02 PROCEDURE — 80307 DRUG TEST PRSMV CHEM ANLYZR: CPT

## 2024-02-02 PROCEDURE — 80346 BENZODIAZEPINES1-12: CPT

## 2024-02-02 PROCEDURE — 80354 DRUG SCREENING FENTANYL: CPT

## 2024-02-02 PROCEDURE — 80373 DRUG SCREENING TRAMADOL: CPT

## 2024-02-02 RX ORDER — ZOLPIDEM TARTRATE 5 MG/1
5 TABLET ORAL NIGHTLY PRN
Qty: 30 TABLET | Refills: 4 | Status: SHIPPED | OUTPATIENT
Start: 2024-02-02 | End: 2024-03-22 | Stop reason: SDUPTHER

## 2024-02-02 RX ORDER — CEFUROXIME AXETIL 250 MG/1
250 TABLET ORAL 2 TIMES DAILY
Qty: 20 TABLET | Refills: 0 | Status: SHIPPED | OUTPATIENT
Start: 2024-02-02 | End: 2024-02-12

## 2024-02-02 NOTE — PROGRESS NOTES
"Subjective   Patient ID: Charo Burgess is a 53 y.o. female who presents for Pain, Insomnia, and Earache.    HPI    Patient presents for chronic pain. Is currently taking oxycodone and gabapentin. Rates the pain a 7/10 over the past 7 days. Reports that the medication gives 80% pain control/relief. OARRS reviewed today, CSA signed. Last took last night.     Patient in office being seen for a follow up on Insomnia. Currently taking trazodone. Last took last night.  Would like to discuss taking a different medication. She states that make her feel \"dopey.\"  She only takes this medication on the weekends because she gets her grand kids on the bus.    She would also like her left ear looked at. She is having pain and soreness in the left side of her throat. She does have a little bit of coughing. States her grand kids were sick with strep and RSV recently. She is not sure if she is getting. Did not test for COVID.  Has taken Tylenol.    Taking current medications which were reviewed.  Problem list discussed.    Overall doing well.  Eating okay.  Staying active.    Has no other new problem /question.     ROS  Constitutional- No activity change. No appetite change.  Eyes- Denies vision changes.  Respiratory- No shortness of breath.  Cardiovascular- No palpitations. No chest pain.  GI- No nausea or vomiting. No diarrhea or constipation. Denies abdominal pain.  Musculoskeletal- Denies joint swelling.  Extremities- No edema.  Neurological- Denies headaches. Denies dizziness.  Skin- No rashes.  Psychiatric/Behavioral- Denies significant anxiety, or depressed mood.     Objective     /66 (BP Location: Left arm, Patient Position: Sitting, BP Cuff Size: Adult)   Pulse 68   Temp 36.7 °C (98.1 °F) (Temporal)   Resp 16   Ht 1.676 m (5' 6\")   Wt 81.8 kg (180 lb 6.4 oz)   LMP  (LMP Unknown)   SpO2 96%   BMI 29.12 kg/m²     Allergies   Allergen Reactions    Codeine Hives       Constitutional-- Well-nourished.  No " distress  Head- unremarkable.  Ears- TMs mildly dull  Eyes- PERRL.  Conjunctiva normal.  Nose-moderately congested with maxillary sinus pressure  Throat- Oropharynx is clear and moist.  Neck- Supple with no thyromegaly.  No significant cervical adenopathy noted.  Pulmonary/Chest- Breath sounds normal with normal effort.  No wheezing.  Heart- Regular rate and rhythm.  No murmur.  Abdomen- Soft and non-tender.  No masses noted.  Musculoskeletal- Normal ROM.  No significant joint swelling  Extremities- No edema.   Neurological- Alert.  No noted deficits.  Skin- Warm.  No rashes.  Psychiatric/Behavioral- Mood and affect normal.  Behavior normal.     Assessment/Plan   1. Primary insomnia  zolpidem (Ambien) 5 mg tablet      2. Primary osteoarthritis involving multiple joints        3. Other hyperlipidemia        4. Pain of lower extremity, unspecified laterality        5. Chronic low back pain, unspecified back pain laterality, unspecified whether sciatica present        6. Malignant neoplasm of ovary, unspecified laterality (CMS/[facility])        7. Malignant neoplasm of left female breast, unspecified estrogen receptor status, unspecified site of breast (CMS/[facility])        8. Left ear pain        9. Sorethroat        10. Therapeutic drug monitoring  Opiate/Opioid/Benzo Prescription Compliance    OOB Internal Tracking      11. Acute non-recurrent sinusitis, unspecified location  cefuroxime (Ceftin) 250 mg tablet      12. Continuous opioid dependence (CMS/[facility])               Long talk. Treatment options reviewed.    Continue current medication. Educated on insomnia and sleep hygiene.    Discussed lower back pain.  Osteoarthritis controlled. Educated the patient on osteoarthritis care and management. Educated on muscle strength and exercise.  Continue to monitor.    Educated on insomnia and sleep hygiene.  Start antibiotic for sinusitis.  Over-the-counter cold medication as needed for symptom relief.  Continues to see her specialist  for ovarian cancer follow-up  Continue and take your medications as prescribed.    Health Maintenance issues discussed.    Importance of healthy diet and regular exercise regimen discussed.    We will contact you with any test results ordered. If you do not hear from us, please contact.    Follow-up as instructed or sooner if any problems or symptoms do not resolve as expected.            Scribe Attestation  By signing my name below, Katelin LEGER Scribe   attest that this documentation has been prepared under the direction and in the presence of Sanjiv Dobson MD.

## 2024-02-06 LAB
AMPHETAMINES UR QL SCN: NORMAL
BARBITURATES UR QL SCN: NORMAL
BZE UR QL SCN: NORMAL
CANNABINOIDS UR QL SCN: NORMAL
CREAT UR-MCNC: 143.8 MG/DL (ref 20–320)
PCP UR QL SCN: NORMAL

## 2024-02-07 PROBLEM — F11.20 CONTINUOUS OPIOID DEPENDENCE (MULTI): Status: ACTIVE | Noted: 2023-02-17

## 2024-02-09 LAB
1OH-MIDAZOLAM UR CFM-MCNC: <25 NG/ML
6MAM UR CFM-MCNC: <25 NG/ML
7AMINOCLONAZEPAM UR CFM-MCNC: <25 NG/ML
A-OH ALPRAZ UR CFM-MCNC: <25 NG/ML
ALPRAZ UR CFM-MCNC: <25 NG/ML
CHLORDIAZEP UR CFM-MCNC: <25 NG/ML
CLONAZEPAM UR CFM-MCNC: <25 NG/ML
CODEINE UR CFM-MCNC: <50 NG/ML
DIAZEPAM UR CFM-MCNC: <25 NG/ML
EDDP UR CFM-MCNC: <25 NG/ML
FENTANYL UR CFM-MCNC: <2.5 NG/ML
HYDROCODONE CTO UR CFM-MCNC: <25 NG/ML
HYDROMORPHONE UR CFM-MCNC: <25 NG/ML
LORAZEPAM UR CFM-MCNC: <25 NG/ML
METHADONE UR CFM-MCNC: <25 NG/ML
MIDAZOLAM UR CFM-MCNC: <25 NG/ML
MORPHINE UR CFM-MCNC: <50 NG/ML
NORDIAZEPAM UR CFM-MCNC: <25 NG/ML
NORFENTANYL UR CFM-MCNC: <2.5 NG/ML
NORHYDROCODONE UR CFM-MCNC: <25 NG/ML
NOROXYCODONE UR CFM-MCNC: >1000 NG/ML
NORTRAMADOL UR-MCNC: <50 NG/ML
OXAZEPAM UR CFM-MCNC: <25 NG/ML
OXYCODONE UR CFM-MCNC: 1022 NG/ML
OXYMORPHONE UR CFM-MCNC: 1033 NG/ML
TEMAZEPAM UR CFM-MCNC: <25 NG/ML
TRAMADOL UR CFM-MCNC: <50 NG/ML
ZOLPIDEM UR CFM-MCNC: <25 NG/ML
ZOLPIDEM UR-MCNC: <25 NG/ML

## 2024-02-22 DIAGNOSIS — M15.9 PRIMARY OSTEOARTHRITIS INVOLVING MULTIPLE JOINTS: ICD-10-CM

## 2024-02-22 RX ORDER — OXYCODONE HYDROCHLORIDE 10 MG/1
10 TABLET ORAL EVERY 8 HOURS PRN
Qty: 90 TABLET | Refills: 0 | Status: SHIPPED | OUTPATIENT
Start: 2024-02-22 | End: 2024-03-22 | Stop reason: SDUPTHER

## 2024-02-22 NOTE — TELEPHONE ENCOUNTER
Rx Controlled Refill Request Telephone Encounter    Name: Charo Burgess  :  1970    Medication Name:   OXYCODONE  Dose (Optional):   10 MG  Quantity (Optional):   90  Directions (Optional):   1 TABLET EVERY 8 HOURS PRN FOR PAIN    ALLERGIES:    ON FILE    LAST DRUG SCREEN:     3/8/23  LAST MED CONTRACT:    3/8/23    Specific Pharmacy location:    [facility] ON FILE    Date of last appointment:    24  Date of next appointment:    24    Best number to reach patient:    [phone]

## 2024-03-22 DIAGNOSIS — F51.01 PRIMARY INSOMNIA: ICD-10-CM

## 2024-03-22 DIAGNOSIS — M15.9 PRIMARY OSTEOARTHRITIS INVOLVING MULTIPLE JOINTS: ICD-10-CM

## 2024-03-22 RX ORDER — ZOLPIDEM TARTRATE 5 MG/1
5 TABLET ORAL NIGHTLY PRN
Qty: 5 TABLET | Refills: 0 | Status: SHIPPED | OUTPATIENT
Start: 2024-03-22 | End: 2024-03-28 | Stop reason: SDUPTHER

## 2024-03-22 RX ORDER — OXYCODONE HYDROCHLORIDE 10 MG/1
10 TABLET ORAL EVERY 8 HOURS PRN
Qty: 90 TABLET | Refills: 0 | Status: SHIPPED | OUTPATIENT
Start: 2024-03-22 | End: 2024-04-22 | Stop reason: SDUPTHER

## 2024-03-22 NOTE — TELEPHONE ENCOUNTER
Patient is calling to request 2 rx refills. She is due on     Rx Controlled Refill Request Telephone Encounter    Name: Charo Burgess  :  1970    Medication Name:   Oxycodone   Dose (Optional):   10 mg  Quantity (Optional):   #90  Directions (Optional):   Take 1 tablet (10 mg) by mouth every 8 hours prn for moderate pain (4-6) or severe pain (7-10)    Medication Name:    Ambien  Dose (Optional):   5 mg  Quantity (Optional): #30    Directions (Optional):   Take 1 tablet (5 mg) by mouth prn at bedtime for sleep     ALLERGIES:    see list     LAST DRUG SCREEN:     24  LAST MED CONTRACT:    24    Specific Pharmacy location:    [facility] and [address]     Date of last appointment:    24  Date of next appointment:    3/26/24    Best number to reach patient:    [phone]

## 2024-03-26 ENCOUNTER — OFFICE VISIT (OUTPATIENT)
Dept: PRIMARY CARE | Facility: CLINIC | Age: 54
End: 2024-03-26
Payer: COMMERCIAL

## 2024-03-26 VITALS
BODY MASS INDEX: 29.51 KG/M2 | HEART RATE: 84 BPM | WEIGHT: 183.6 LBS | OXYGEN SATURATION: 97 % | RESPIRATION RATE: 16 BRPM | DIASTOLIC BLOOD PRESSURE: 76 MMHG | TEMPERATURE: 98.3 F | SYSTOLIC BLOOD PRESSURE: 124 MMHG | HEIGHT: 66 IN

## 2024-03-26 DIAGNOSIS — F11.20 CONTINUOUS OPIOID DEPENDENCE (MULTI): ICD-10-CM

## 2024-03-26 DIAGNOSIS — C56.9 MALIGNANT NEOPLASM OF OVARY, UNSPECIFIED LATERALITY (MULTI): ICD-10-CM

## 2024-03-26 DIAGNOSIS — E78.49 OTHER HYPERLIPIDEMIA: ICD-10-CM

## 2024-03-26 DIAGNOSIS — M15.9 PRIMARY OSTEOARTHRITIS INVOLVING MULTIPLE JOINTS: Primary | ICD-10-CM

## 2024-03-26 DIAGNOSIS — F51.01 PRIMARY INSOMNIA: ICD-10-CM

## 2024-03-26 PROCEDURE — 1036F TOBACCO NON-USER: CPT | Performed by: FAMILY MEDICINE

## 2024-03-26 PROCEDURE — 3008F BODY MASS INDEX DOCD: CPT | Performed by: FAMILY MEDICINE

## 2024-03-26 PROCEDURE — 99213 OFFICE O/P EST LOW 20 MIN: CPT | Performed by: FAMILY MEDICINE

## 2024-03-26 RX ORDER — DICLOFENAC SODIUM 75 MG/1
75 TABLET, DELAYED RELEASE ORAL 2 TIMES DAILY PRN
Qty: 60 TABLET | Refills: 3 | Status: SHIPPED | OUTPATIENT
Start: 2024-03-26 | End: 2025-03-26

## 2024-03-26 NOTE — PROGRESS NOTES
"Subjective   Patient ID: Charo Burgess is a 53 y.o. female who presents for Osteoarthritis and Insomnia.  HPI  Patient presents today for a follow-up on Osteoarthritis. Is taking Oxycodone 10 MG. States she has no concerns with this medication. Rates the pain a 7/10 over the past 7 days. Reports that the medication gives 80% pain control/relief. OARRS reviewed today. Controlled substance contract signed on 2-2-24. UDS 2-2-24.    Also presents today for a follow-up on Insomnia. Is taking Ambien 5 MG. No issues with the medication. Reports that the medication gives 80% insomnia control/relief. OARRS reviewed today. Controlled substance contract signed today. UDS 2-2-24.    Sees her Oncologist on 4-1-24.      Taking current medications which were reviewed.  Problem list discussed.    Overall doing well.  Eating okay.  Staying active.    Has no other new problem /question.    ROS  Constitutional- No activity change. No appetite change.  Eyes- Denies vision changes.  Respiratory- No shortness of breath.  Cardiovascular- No palpitations. No chest pain.  GI- No nausea or vomiting. No diarrhea or constipation. Denies abdominal pain.  Musculoskeletal- myalgias  Extremities- No edema.  Neurological- Denies headaches. Denies dizziness.  Skin- No rashes.  Psychiatric/Behavioral- Denies significant anxiety, or depressed mood.     Objective     /76   Pulse 84   Temp 36.8 °C (98.3 °F)   Resp 16   Ht 1.676 m (5' 6\")   Wt 83.3 kg (183 lb 9.6 oz)   LMP  (LMP Unknown)   SpO2 97%   BMI 29.63 kg/m²     Allergies   Allergen Reactions    Codeine Hives       Constitutional-- Well-nourished.  No distress  Head- unremarkable.  Eyes- PERRL.  Conjunctiva normal.  Nose- Normal.  No rhinorrhea noted.  Throat- Oropharynx is clear and moist.  Neck- Supple with no thyromegaly.  No significant cervical adenopathy noted.  Pulmonary/Chest- Breath sounds normal with normal effort.  No wheezing.  Heart- Regular rate and rhythm.  No " murmur.  Abdomen- Soft and non-tender.  No masses noted.  Musculoskeletal-chronic low back pain exacerbated with range of motion.  OA changes hands noted.  Extremities- No edema.   Neurological- Alert.  No noted deficits.  Skin- Warm.  No rashes.  Psychiatric/Behavioral- Mood and affect normal.  Behavior normal.     Assessment/Plan   1. Primary osteoarthritis involving multiple joints  diclofenac (Voltaren) 75 mg EC tablet      2. Other hyperlipidemia  CBC and Auto Differential    Comprehensive metabolic panel    Lipid panel      3. Continuous opioid dependence (CMS/HCC)        4. Malignant neoplasm of ovary, unspecified laterality (CMS/HCC)  CBC and Auto Differential    Comprehensive metabolic panel      5. Primary insomnia  CBC and Auto Differential    Comprehensive metabolic panel      6. BMI 29.0-29.9,adult               Long talk. Treatment options reviewed.    Osteoarthritis controlled. Educated the patient on osteoarthritis care and management. Educated on muscle strength and exercise. Take Voltaren as discussed. Advised patient to use Lidocaine gel. Continue to monitor.     Educated on insomnia and sleep hygiene.  Continue to follow-up with oncologist for her ovarian cancer and upper abdominal discomfort    Discussed importance of natural sources of nutrition.  Advised patient to consume vegetables, salads, fruits, nuts, and proteins such as fish and chicken.  Discussed portion control.      Complete blood work as discussed.     Continue and take your medications as prescribed.    Health Maintenance issues discussed.    Importance of healthy diet and regular exercise regimen discussed.    We will contact you with any test results ordered. If you do not hear from us, please contact.    Follow-up 6/2024 as instructed or sooner if any problems or symptoms do not resolve as expected.        Scribe Attestation  By signing my name below, IKatelin Scribe   attest that this documentation has been prepared  under the direction and in the presence of Sanjiv Dobson MD.

## 2024-03-28 DIAGNOSIS — F51.01 PRIMARY INSOMNIA: ICD-10-CM

## 2024-03-28 RX ORDER — ZOLPIDEM TARTRATE 5 MG/1
5 TABLET ORAL NIGHTLY PRN
Qty: 30 TABLET | Refills: 3 | Status: SHIPPED | OUTPATIENT
Start: 2024-03-28 | End: 2024-06-04 | Stop reason: SDUPTHER

## 2024-03-28 NOTE — TELEPHONE ENCOUNTER
Patient is calling - patient saw Dr. Dobson on 3/26/24 - needed a new prescription for Ambien 5 mg - only 5 tablets were sent to her pharmacy - only has 3 pills left needs the remainder of the medication sent to her pharmacy - [facility] [address] [place].

## 2024-03-29 ENCOUNTER — LAB (OUTPATIENT)
Dept: LAB | Facility: LAB | Age: 54
End: 2024-03-29
Payer: COMMERCIAL

## 2024-03-29 DIAGNOSIS — F51.01 PRIMARY INSOMNIA: ICD-10-CM

## 2024-03-29 DIAGNOSIS — E78.49 OTHER HYPERLIPIDEMIA: ICD-10-CM

## 2024-03-29 DIAGNOSIS — C56.9 MALIGNANT NEOPLASM OF OVARY, UNSPECIFIED LATERALITY (MULTI): ICD-10-CM

## 2024-03-29 LAB
ALBUMIN SERPL BCP-MCNC: 4.3 G/DL (ref 3.4–5)
ALP SERPL-CCNC: 96 U/L (ref 33–110)
ALT SERPL W P-5'-P-CCNC: 22 U/L (ref 7–45)
ANION GAP SERPL CALC-SCNC: 10 MMOL/L (ref 10–20)
AST SERPL W P-5'-P-CCNC: 18 U/L (ref 9–39)
BASOPHILS # BLD AUTO: 0.03 X10*3/UL (ref 0–0.1)
BASOPHILS NFR BLD AUTO: 0.5 %
BILIRUB SERPL-MCNC: 0.5 MG/DL (ref 0–1.2)
BUN SERPL-MCNC: 18 MG/DL (ref 6–23)
CALCIUM SERPL-MCNC: 9.9 MG/DL (ref 8.6–10.6)
CHLORIDE SERPL-SCNC: 104 MMOL/L (ref 98–107)
CHOLEST SERPL-MCNC: 291 MG/DL (ref 0–199)
CHOLESTEROL/HDL RATIO: 7.5
CO2 SERPL-SCNC: 28 MMOL/L (ref 21–32)
CREAT SERPL-MCNC: 0.77 MG/DL (ref 0.5–1.05)
EGFRCR SERPLBLD CKD-EPI 2021: >90 ML/MIN/1.73M*2
EOSINOPHIL # BLD AUTO: 0.08 X10*3/UL (ref 0–0.7)
EOSINOPHIL NFR BLD AUTO: 1.2 %
ERYTHROCYTE [DISTWIDTH] IN BLOOD BY AUTOMATED COUNT: 13.1 % (ref 11.5–14.5)
GLUCOSE SERPL-MCNC: 102 MG/DL (ref 74–99)
HCT VFR BLD AUTO: 41 % (ref 36–46)
HDLC SERPL-MCNC: 38.8 MG/DL
HGB BLD-MCNC: 13.2 G/DL (ref 12–16)
IMM GRANULOCYTES # BLD AUTO: 0.03 X10*3/UL (ref 0–0.7)
IMM GRANULOCYTES NFR BLD AUTO: 0.5 % (ref 0–0.9)
LDLC SERPL CALC-MCNC: 193 MG/DL
LYMPHOCYTES # BLD AUTO: 2.06 X10*3/UL (ref 1.2–4.8)
LYMPHOCYTES NFR BLD AUTO: 31.2 %
MCH RBC QN AUTO: 28.6 PG (ref 26–34)
MCHC RBC AUTO-ENTMCNC: 32.2 G/DL (ref 32–36)
MCV RBC AUTO: 89 FL (ref 80–100)
MONOCYTES # BLD AUTO: 0.5 X10*3/UL (ref 0.1–1)
MONOCYTES NFR BLD AUTO: 7.6 %
NEUTROPHILS # BLD AUTO: 3.9 X10*3/UL (ref 1.2–7.7)
NEUTROPHILS NFR BLD AUTO: 59 %
NON HDL CHOLESTEROL: 252 MG/DL (ref 0–149)
NRBC BLD-RTO: 0 /100 WBCS (ref 0–0)
PLATELET # BLD AUTO: 248 X10*3/UL (ref 150–450)
POTASSIUM SERPL-SCNC: 4.4 MMOL/L (ref 3.5–5.3)
PROT SERPL-MCNC: 7 G/DL (ref 6.4–8.2)
RBC # BLD AUTO: 4.62 X10*6/UL (ref 4–5.2)
SODIUM SERPL-SCNC: 138 MMOL/L (ref 136–145)
TRIGL SERPL-MCNC: 297 MG/DL (ref 0–149)
VLDL: 59 MG/DL (ref 0–40)
WBC # BLD AUTO: 6.6 X10*3/UL (ref 4.4–11.3)

## 2024-03-29 PROCEDURE — 85025 COMPLETE CBC W/AUTO DIFF WBC: CPT

## 2024-03-29 PROCEDURE — 80061 LIPID PANEL: CPT

## 2024-03-29 PROCEDURE — 80053 COMPREHEN METABOLIC PANEL: CPT

## 2024-03-29 PROCEDURE — 36415 COLL VENOUS BLD VENIPUNCTURE: CPT

## 2024-04-01 DIAGNOSIS — E78.49 OTHER HYPERLIPIDEMIA: ICD-10-CM

## 2024-04-01 RX ORDER — PRAVASTATIN SODIUM 10 MG/1
10 TABLET ORAL DAILY
Qty: 30 TABLET | Refills: 5 | Status: SHIPPED | OUTPATIENT
Start: 2024-04-01

## 2024-04-01 NOTE — TELEPHONE ENCOUNTER
----- Message from Sanjiv Dobson MD sent at 3/31/2024  6:58 AM EDT -----  Labs are within normal limits or stable except for extremely high cholesterol.  I believe she is not taking her statin.  We could go back on her previous statin or switch to 1 that may cause fewer side effects such as Crestor 10 mg daily.  Please let me know which she would prefer and then we can arrange

## 2024-04-01 NOTE — TELEPHONE ENCOUNTER
Patient is aware. RX pending approval. Lab ordered. She will check with the pharmacy tomorrow.         MD Cherie Boone; [id number] [place] [id number] Clinical Support Staff9 minutes ago (2:32 PM)       Okay for Pravachol 10 mg daily.  30 pills with 5 refills.  Repeat lipid profile with diagnosis of hypercholesterolemia in 1 month.  Please arrange and let him know

## 2024-04-01 NOTE — TELEPHONE ENCOUNTER
PATIENT IS AWARE SAID GOING BACK ON CHOLESTEROL MED IS FINE BUT NOT CRESTOR.  PLEASE ADVISE WHAT MEDICATION YOU WOULD RECOMMEND.    PHARMACY IS [facility] [address]   PLEASE ADVISE

## 2024-04-22 DIAGNOSIS — M15.9 PRIMARY OSTEOARTHRITIS INVOLVING MULTIPLE JOINTS: ICD-10-CM

## 2024-04-22 RX ORDER — OXYCODONE HYDROCHLORIDE 10 MG/1
10 TABLET ORAL EVERY 8 HOURS PRN
Qty: 90 TABLET | Refills: 0 | Status: SHIPPED | OUTPATIENT
Start: 2024-04-22 | End: 2024-05-20 | Stop reason: SDUPTHER

## 2024-04-22 NOTE — TELEPHONE ENCOUNTER
Rx Controlled Refill Request Telephone Encounter    Name: Charo Burgess  :  1970    Medication Name:   oxyCODONE (Roxicodone) 10 mg   Quantity (Optional):   90  Directions (Optional):   Take 1 tablet (10 mg) by mouth every 8 hours if needed for moderate pain (4 - 6) or severe pain (7 - 10).     LAST DRUG SCREEN:       LAST MED CONTRACT:        Specific Pharmacy location:    [facility]    Date of last appointment:    3/26/24  Date of next appointment:

## 2024-05-01 ENCOUNTER — APPOINTMENT (OUTPATIENT)
Dept: PRIMARY CARE | Facility: CLINIC | Age: 54
End: 2024-05-01
Payer: COMMERCIAL

## 2024-05-01 NOTE — PROGRESS NOTES
Subjective   Patient ID: Charo Burgess is a 54 y.o. female who presents for No chief complaint on file..  HPI    Patient presents for chronic pain. Is currently taking Oxycodone. Rates the pain a ***/10 over the past 7 days. Reports that the medication gives ***% pain control/relief. OARRS reviewed today, CSA 2/2/24. Last took ***.     Patient in office being seen for a follow up on Insomnia. Currently taking Ambien. Last took ***. Medication agreement signed on 3/26/24. Reports that the medication is working ***/10. ***% managed with the medication. States there are no adverse effects.     BW done 3/29/24  Mammogram done 3/11/24  Sigmoidoscopy 3-5-24    Taking current medications which were reviewed.  Problem list discussed.    Overall doing well.  Eating okay.  Staying active.    Has no other new problem /question.     ROS  Constitutional- No activity change. No appetite change.  Eyes- Denies vision changes.  Respiratory- No shortness of breath.  Cardiovascular- No palpitations. No chest pain.  GI- No nausea or vomiting. No diarrhea or constipation. Denies abdominal pain.  Musculoskeletal- Denies joint swelling.  Extremities- No edema.  Neurological- Denies headaches. Denies dizziness.  Skin- No rashes.  Psychiatric/Behavioral- Denies significant anxiety, or depressed mood.     Objective     LMP  (LMP Unknown)     Allergies   Allergen Reactions    Codeine Hives       Constitutional-- Well-nourished.  No distress  Head- unremarkable.  Ears- TMs clear.  Eyes- PERRL.  Conjunctiva normal.  Nose- Normal.  No rhinorrhea noted.  Throat- Oropharynx is clear and moist.  Neck- Supple with no thyromegaly.  No significant cervical adenopathy noted.  Pulmonary/Chest- Breath sounds normal with normal effort.  No wheezing.  Heart- Regular rate and rhythm.  No murmur.  Abdomen- Soft and non-tender.  No masses noted.  Musculoskeletal- Normal ROM.  No significant joint swelling  Extremities- No edema.   Neurological- Alert.  No  noted deficits.  Skin- Warm.  No rashes.  Psychiatric/Behavioral- Mood and affect normal.  Behavior normal.     Assessment/Plan   No diagnosis found.       Long talk. Treatment options reviewed.    Continue and take your medications as prescribed.    Health Maintenance issues discussed.    Importance of healthy diet and regular exercise regimen discussed.    We will contact you with any test results ordered. If you do not hear from us, please contact.    Follow-up as instructed or sooner if any problems or symptoms do not resolve as expected.

## 2024-05-07 ENCOUNTER — OFFICE VISIT (OUTPATIENT)
Dept: PRIMARY CARE | Facility: CLINIC | Age: 54
End: 2024-05-07
Payer: COMMERCIAL

## 2024-05-07 VITALS
RESPIRATION RATE: 18 BRPM | HEART RATE: 73 BPM | DIASTOLIC BLOOD PRESSURE: 80 MMHG | HEIGHT: 66 IN | OXYGEN SATURATION: 98 % | BODY MASS INDEX: 29.47 KG/M2 | SYSTOLIC BLOOD PRESSURE: 120 MMHG | WEIGHT: 183.4 LBS

## 2024-05-07 DIAGNOSIS — E78.49 OTHER HYPERLIPIDEMIA: ICD-10-CM

## 2024-05-07 DIAGNOSIS — F51.01 PRIMARY INSOMNIA: ICD-10-CM

## 2024-05-07 DIAGNOSIS — R10.11 RIGHT UPPER QUADRANT ABDOMINAL PAIN: Primary | ICD-10-CM

## 2024-05-07 DIAGNOSIS — M15.9 PRIMARY OSTEOARTHRITIS INVOLVING MULTIPLE JOINTS: ICD-10-CM

## 2024-05-07 PROCEDURE — 99214 OFFICE O/P EST MOD 30 MIN: CPT | Performed by: FAMILY MEDICINE

## 2024-05-07 PROCEDURE — 3008F BODY MASS INDEX DOCD: CPT | Performed by: FAMILY MEDICINE

## 2024-05-07 RX ORDER — PREDNISONE 10 MG/1
TABLET ORAL
Qty: 24 TABLET | Refills: 0 | Status: SHIPPED | OUTPATIENT
Start: 2024-05-07

## 2024-05-07 NOTE — PROGRESS NOTES
"Subjective   Patient ID: Charo Burgess is a 54 y.o. female who presents for Pain and Insomnia.  HPI    Patient presents for chronic pain. Is currently taking oxycodone. Rates the pain a 10/10 over the past 7 days. Reports that the medication gives 70% pain control/relief. OARRS reviewed today, CSA signed . Last took this morning.     Patient in office being seen for a follow up on Insomnia. Currently taking Ambien. Last took last night. Medication agreement signed. Reports that the medication is working 10/10. 100% managed with the medication. States there are no adverse effects.     Would like to have her CT from 4/12/24. Admits that there is something there. Admits that this feels like it is moving. Would like to know if she can be sent to someone for this. This has been ongoing for a year. Admits that this has been really bothering her for the last 2 months. Patient was prescribed diclofenac last time she was here.      BW done 3/29/2024    Taking current medications which were reviewed.  Problem list discussed.    Overall doing well.  Eating okay.  Staying active.    Has no other new problem /question.     ROS  Constitutional- No activity change. No appetite change.  Eyes- Denies vision changes.  Respiratory- No shortness of breath.  Cardiovascular- No palpitations. No chest pain.  GI- No nausea or vomiting. No diarrhea or constipation.   Musculoskeletal- Denies joint swelling.  Extremities- No edema.  Neurological- Denies headaches. Denies dizziness.  Skin- No rashes.  Psychiatric/Behavioral- Denies significant anxiety, or depressed mood.     Objective     /80   Pulse 73   Resp 18   Ht 1.676 m (5' 6\")   Wt 83.2 kg (183 lb 6.4 oz)   LMP  (LMP Unknown)   SpO2 98%   BMI 29.60 kg/m²     Allergies   Allergen Reactions    Codeine Hives       Constitutional-- Well-nourished.  No distress  Head- unremarkable.  Eyes- PERRL.  Conjunctiva normal.  Nose- Normal.  No rhinorrhea noted.  Throat- Oropharynx is " clear and moist.  Neck- Supple with no thyromegaly.  No significant cervical adenopathy noted.  Pulmonary/Chest- Breath sounds normal with normal effort.  No wheezing.  Heart- Regular rate and rhythm.  No murmur.  Abdomen- Soft and non-tender.  No masses noted.  Musculoskeletal- Normal ROM.  No significant joint swelling  Neurological- Alert.  No noted deficits.  Skin- Warm.  No rashes.  Psychiatric/Behavioral-mildly anxious mood.  Very concerned about her abdominal pain.  Workup has otherwise been negative.    Assessment/Plan   1. Right upper quadrant abdominal pain  predniSONE (Deltasone) 10 mg tablet    Referral to Gastroenterology      2. Primary osteoarthritis involving multiple joints        3. Other hyperlipidemia        4. Primary insomnia               Long talk. Treatment options reviewed.  Spent 30 minutes with the patient, with at least 1/2 of the time spent in counseling and instruction.  Discussed abdominal pain.  Suspect possible inflammatory component.  No sign of infection.  Take Prednisone as discussed.  Follow up with Gastroenterology.  Continue to monitor.     Osteoarthritis controlled. Educated the patient on osteoarthritis care and management. Educated on muscle strength and exercise.    Educated on insomnia and sleep hygiene.    Continue and take your medications as prescribed.    Health Maintenance issues discussed.    Importance of healthy diet and regular exercise regimen discussed.    We will contact you with any test results ordered. If you do not hear from us, please contact.    Follow-up as instructed or sooner if any problems or symptoms do not resolve as expected.          Scribe Attestation  By signing my name below, Katelin LEGER Scribe   attest that this documentation has been prepared under the direction and in the presence of Sanjiv Dobson MD.

## 2024-05-08 ENCOUNTER — APPOINTMENT (OUTPATIENT)
Dept: PRIMARY CARE | Facility: CLINIC | Age: 54
End: 2024-05-08
Payer: COMMERCIAL

## 2024-05-20 DIAGNOSIS — M15.9 PRIMARY OSTEOARTHRITIS INVOLVING MULTIPLE JOINTS: ICD-10-CM

## 2024-05-20 RX ORDER — OXYCODONE HYDROCHLORIDE 10 MG/1
10 TABLET ORAL EVERY 8 HOURS PRN
Qty: 90 TABLET | Refills: 0 | Status: SHIPPED | OUTPATIENT
Start: 2024-05-20 | End: 2024-06-19

## 2024-05-20 NOTE — TELEPHONE ENCOUNTER
Rx Controlled Refill Request Telephone Encounter    Name: Charo Burgess  :  1970    Medication Name:   oxyCODONE (Roxicodone) 10 mg immediate release tablet   Quantity (Optional):   90  Directions (Optional):   Take 1 tablet (10 mg) by mouth every 8 hours if needed for moderate pain (4 - 6) or severe pain (7 - 10).     LAST DRUG SCREEN:     24  LAST MED CONTRACT:    24    Specific Pharmacy location:    [facility] #[id number] [place] [id number] AT [address]     Date of last appointment:    24  Date of next appointment:    24

## 2024-06-04 DIAGNOSIS — F51.01 PRIMARY INSOMNIA: ICD-10-CM

## 2024-06-04 RX ORDER — ZOLPIDEM TARTRATE 5 MG/1
5 TABLET ORAL NIGHTLY PRN
Qty: 30 TABLET | Refills: 3 | Status: SHIPPED | OUTPATIENT
Start: 2024-06-04

## 2024-06-04 NOTE — TELEPHONE ENCOUNTER
MEDICATION PENDED    Rx Controlled Refill Request Telephone Encounter    Name: Charo Burgess  :  1970    Medication Name:   AMBIEN   Dose (Optional):   5 MG  Quantity (Optional):   30  Directions (Optional):   TAKE 1 AT NIGHT    ALLERGIES:    SEE LIST    LAST DRUG SCREEN:   2024  LAST MED CONTRACT:    2024    Specific Pharmacy location:    [facility]    Date of last appointment:    2024  Date of next appointment:    2024    Best number to reach patient:    [phone]

## 2024-06-19 DIAGNOSIS — M15.9 PRIMARY OSTEOARTHRITIS INVOLVING MULTIPLE JOINTS: ICD-10-CM

## 2024-06-19 RX ORDER — OXYCODONE HYDROCHLORIDE 10 MG/1
10 TABLET ORAL EVERY 8 HOURS PRN
Qty: 90 TABLET | Refills: 0 | Status: SHIPPED | OUTPATIENT
Start: 2024-06-19 | End: 2024-07-19

## 2024-06-19 NOTE — TELEPHONE ENCOUNTER
Rx Refill Request Telephone Encounter    Name:  Charo Burgess  : 1970     Medication Name:  oxyCODONE (Roxicodone) 10 mg   Quantity (Optional):    90  Directions (Optional):   Take 1 tablet (10 mg) by mouth every 8 hours if needed for moderate pain (4 - 6) or severe pain (7 - 10).     Specific Pharmacy location:  [facility] #[id number] [place] [id number] AT [address]     Date of last appointment:  24  Date of next appointment:  24

## 2024-06-20 DIAGNOSIS — M79.606 PAIN OF LOWER EXTREMITY, UNSPECIFIED LATERALITY: ICD-10-CM

## 2024-06-20 RX ORDER — GABAPENTIN 300 MG/1
CAPSULE ORAL
Qty: 120 CAPSULE | Refills: 0 | Status: SHIPPED | OUTPATIENT
Start: 2024-06-20

## 2024-06-24 ENCOUNTER — APPOINTMENT (OUTPATIENT)
Dept: GASTROENTEROLOGY | Facility: CLINIC | Age: 54
End: 2024-06-24
Payer: COMMERCIAL

## 2024-06-24 VITALS
SYSTOLIC BLOOD PRESSURE: 120 MMHG | HEART RATE: 64 BPM | HEIGHT: 66 IN | BODY MASS INDEX: 29.2 KG/M2 | OXYGEN SATURATION: 97 % | RESPIRATION RATE: 18 BRPM | DIASTOLIC BLOOD PRESSURE: 78 MMHG | WEIGHT: 181.7 LBS

## 2024-06-24 DIAGNOSIS — R10.11 RIGHT UPPER QUADRANT ABDOMINAL PAIN: ICD-10-CM

## 2024-06-24 PROCEDURE — 3008F BODY MASS INDEX DOCD: CPT | Performed by: INTERNAL MEDICINE

## 2024-06-24 PROCEDURE — 1036F TOBACCO NON-USER: CPT | Performed by: INTERNAL MEDICINE

## 2024-06-24 PROCEDURE — 99204 OFFICE O/P NEW MOD 45 MIN: CPT | Performed by: INTERNAL MEDICINE

## 2024-06-24 ASSESSMENT — ENCOUNTER SYMPTOMS
CARDIOVASCULAR NEGATIVE: 1
NEUROLOGICAL NEGATIVE: 1
ABDOMINAL PAIN: 1
RESPIRATORY NEGATIVE: 1
CONSTITUTIONAL NEGATIVE: 1
ENDOCRINE NEGATIVE: 1

## 2024-06-24 NOTE — PATIENT INSTRUCTIONS
Right upper quadrant abdominal pain  -     Referral to Gastroenterology  -     NM hepatobiliary w cholecystokinin; Future  -     Esophagogastroduodenoscopy (EGD); Future    If the above tests are normal then can consider pain management.   Call with questions.

## 2024-06-24 NOTE — PROGRESS NOTES
Subjective   Patient ID: Charo Burgess is a 54 y.o. female who presents for Abdominal Pain (Pt states having severe left abdominal pain right under breast that is radiating around the rib cages to her back. Hx of ovarian cancer, EGD performed in 2020 (results printed). States she went to Dr. Dobson with concerns of severe pain. ).  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  Has been getting routine radiological surveillance which have been negative.  Follows up with Dr. Saleh at [facility].  Over the last 4 to 5 months she has been concerned about a soft tissue mass in the right upper quadrant which is causing pain radiating to the back.  She was seen in the oncology office for this and underwent colonoscopy which showed a descending colon polyp and was otherwise normal.  She also had a CT of the chest with IV contrast which did not show any acute abnormality.  No evidence of chest mass.  Denies any associated GI symptoms.  Recently had a CT scan of abdominal pelvis with IV contrast which was overall benign.        RUQ pain started 1 year ago and it is going into the back.   The pain can be quite severe and will stay for good 10-15 min.     Recent CT scan of the chest was negative.   Colonoscopy was in 2024 was negative.   Not associated with meals.     Previous hytrectomy.     Current Outpatient Medications on File Prior to Visit   Medication Sig Dispense Refill    atorvastatin (Lipitor) 10 mg tablet take 1 tablet by mouth once daily 90 tablet 0    diclofenac (Voltaren) 75 mg EC tablet Take 1 tablet (75 mg) by mouth 2 times a day as needed (pain). Do not crush, chew, or split. 60 tablet 3    exemestane (Aromasin) 25 mg tablet Take after a meal.  Try to take at the same time each day.      gabapentin (Neurontin) 300 mg capsule Take 1 capsule by mouth 4 times daily. 120 capsule 0    ondansetron (Zofran) 8 mg  tablet       oxyCODONE (Roxicodone) 10 mg immediate release tablet Take 1 tablet (10 mg) by mouth every 8 hours if needed for moderate pain (4 - 6) or severe pain (7 - 10). 90 tablet 0    pravastatin (Pravachol) 10 mg tablet Take 1 tablet (10 mg) by mouth once daily. 30 tablet 5    predniSONE (Deltasone) 10 mg tablet TID FOR 5 DAYS THEN BID FOR 3 DAYS THEN 1 DAILY 24 tablet 0    valACYclovir (Valtrex) 1 gram tablet Take 1 tablet (1,000 mg) by mouth 2 times a day.      zolpidem (Ambien) 5 mg tablet Take 1 tablet (5 mg) by mouth as needed at bedtime for sleep. 30 tablet 3    [DISCONTINUED] gabapentin (Neurontin) 300 mg capsule Take 1 capsule by mouth 4 times daily. 120 capsule 3    [DISCONTINUED] oxyCODONE (Roxicodone) 10 mg immediate release tablet Take 1 tablet (10 mg) by mouth every 8 hours if needed for moderate pain (4 - 6) or severe pain (7 - 10). 90 tablet 0     No current facility-administered medications on file prior to visit.        Review of Systems   Constitutional: Negative.    Respiratory: Negative.     Cardiovascular: Negative.    Gastrointestinal:  Positive for abdominal pain.   Endocrine: Negative.    Genitourinary: Negative.    Skin: Negative.    Neurological: Negative.        Objective   Physical Exam  Constitutional:       Appearance: Normal appearance.   HENT:      Head: Normocephalic and atraumatic.   Cardiovascular:      Rate and Rhythm: Normal rate and regular rhythm.      Pulses: Normal pulses.      Heart sounds: Normal heart sounds.   Pulmonary:      Effort: Pulmonary effort is normal.      Breath sounds: Normal breath sounds.   Abdominal:      General: Abdomen is flat. Bowel sounds are normal.      Palpations: Abdomen is soft.      Tenderness: There is abdominal tenderness.   Musculoskeletal:         General: Normal range of motion.      Cervical back: Normal range of motion.   Skin:     General: Skin is warm.   Neurological:      Mental Status: She is alert.       /78 (BP Location:  "Right arm, Patient Position: Sitting, BP Cuff Size: Large adult)   Pulse 64   Resp 18   Ht 1.676 m (5' 6\")   Wt 82.4 kg (181 lb 11.2 oz)   LMP  (LMP Unknown)   SpO2 97%   BMI 29.33 kg/m²      Lab Results   Component Value Date    WBC 6.6 03/29/2024    HGB 13.2 03/29/2024    HCT 41.0 03/29/2024    MCV 89 03/29/2024     03/29/2024           No lab exists for component: \"LABALBU\"    No results found for: \"AFP\"  Lab Results   Component Value Date    TSH 1.95 11/08/2022     Flexible Sigmoidoscopy  Order: [id number]  [facility]  Patient Name: Charo BURNS  Procedure Date: 3/5/2024 8:25 AM  MRN: [id number]  YOB: 1970  Age: 53  Gender: Female  Race: Unknown  Attending MD: Senait Blanco MD, [id number]  Procedure:             Colonoscopy  Referring MD:          JALEN MISHRA MD  Providers:       Senait Blanco MD, Tanisha Rich CRNA (Anesthetist)  Indications:           High risk colon cancer surveillance: Personal history                         of colonic polyps  Findings:              A 3 mm polyp was found in the descending colon. The                         polyp was sessile. The polyp was removed with a cold                         biopsy forceps. Resection and retrieval were complete.                         Verification of patient identification for the                         specimen was done by the nurse using the patient's                         name and birth date.                         Internal hemorrhoids were found during retroflexion.                         The hemorrhoids were Grade I (internal hemorrhoids                         that do not prolapse).  Patient Profile:       This is a 53 year old female. Refer to note in patient                         chart for documentation of history and physical.  Impression:            - One 3 mm polyp in the descending colon, removed with                         a cold biopsy forceps. " Resected and retrieved.                         - Internal hemorrhoids.  Recommendation:        - Patient has a contact number available for                         emergencies. The signs and symptoms of potential                         delayed complications were discussed with the patient.                         Return to normal activities tomorrow. Written                         discharge instructions were provided to the patient.                         - Resume previous diet.                         - Continue present medications.                         - Await pathology results.                         - Repeat colonoscopy is recommended for surveillance.                         The colonoscopy date will be determined after                         pathology results from today's exam become available                         for review.  Medicines:             Propofol per Anesthesia  Procedure:             Pre-Anesthesia Assessment:                         - Prior to the procedure, a History and Physical was                         performed, and patient medications and allergies were                         reviewed. The patient is competent. The risks and                         benefits of the procedure and the sedation options and                         risks were discussed with the patient. All questions                         were answered and informed consent was obtained.                         Patient identification and proposed procedure were                         verified by the physician, the nurse and the                         anesthetist in the procedure room. Mental Status                         Examination: alert and oriented. Airway Examination:                         normal oropharyngeal airway and neck mobility.                         Respiratory Examination: clear to auscultation. CV                         Examination: normal. Prophylactic Antibiotics: The                          patient does not require prophylactic antibiotics.                         Prior Anticoagulants: The patient has taken no                         anticoagulant or antiplatelet agents. ASA Grade                         Assessment: II - A patient with mild systemic disease.                         After reviewing the risks and benefits, the patient                         was deemed in satisfactory condition to undergo the                         procedure. The anesthesia plan was to use deep                         sedation / analgesia. Immediately prior to                         administration of medications, the patient was                         re-assessed for adequacy to receive sedatives. The                         heart rate, respiratory rate, oxygen saturations,                         blood pressure, adequacy of pulmonary ventilation, and                         response to care were monitored throughout the                         procedure. The physical status of the patient was                         re-assessed after the procedure.                         After I obtained informed consent, the scope was                         passed under direct vision. Throughout the procedure,                         the patient's blood pressure, pulse, and oxygen                         saturations were monitored continuously. Provation                         AI/GI Genius was used during withdrawal. The                         COLONOSCOPE was introduced through the anus and                         advanced to the cecum, identified by appendiceal                         orifice and ileocecal valve. The colonoscopy was                         performed without difficulty. The patient tolerated                         the procedure well. The quality of the bowel                         preparation was good. The ileocecal valve, appendiceal                         orifice, and rectum were  photographed.  Complications:         No immediate complications.  Procedure Code(s):     --- Professional ---                         53327  Diagnosis Code(s):     --- Professional ---                         Z86.010                         D12.4                         K64.0  CPT copyright 2021 [employer]. All rights reserved.  The codes documented in this report are preliminary and upon  review may  be revised to meet current compliance requirements.  Senait Blanco MD  3/5/2024 8:46:08 AM  This report has been signed electronically.  Number of Addenda: 0  Note Initiated On: 3/5/2024 8:25 AM  Exam End: --    Specimen Collected: 03/05/24 08:25 Last Resulted: 03/05/24 08:44   Received From: [facility]  Result Received: 03/07/24 04:52      CT abdomen pelvis w IV contrast  Order: [id number]  Impression    IMPRESSION:    Stable incidental findings as detailed above.  No acute abdominal or pelvic process is seen.                : PSCB    Transcribe Date/Time: Apr 15 2024  2:08P    Dictated by : SERJIO MANCINI MD    This examination was interpreted and the report reviewed and  electronically signed by:  SERJIO MANCINI MD on Apr 15 2024  2:18PM  EST  Narrative    * * *Final Report* * *    DATE OF EXAM: Apr 12 2024  1:25PM      [facility]ILL[facility]   0530  -  CT ABD/PEL W IVCON  / ACCESSION #  [id number]    PROCEDURE REASON: Granulosa cell tumor of right ovary        * * * * Physician Interpretation * * * *     EXAMINATION:  CT ABDOMEN AND PELVIS WITH IV CONTRAST    CLINICAL HISTORY: Right ovarian tumor    TECHNIQUE: CT of the abdomen and pelvis was performed using standard  technique, scanning from just above the dome of the diaphragm to the  symphysis pubis.  MQ:  CTAP_3    Contrast:  IV:  100 ml of Omnipaque 350  Oral:  450 ml of Omni 240 10-25ml diluted with water    CT Radiation dose: Integrated Dose-length product (DLP) for this visit =    518 mGy*cm.  CT Dose Reduction Employed:  Automated exposure control(AEC) and iterative  recon    COMPARISON: None.      RESULT:    Liver: Stable 0.5 cm and 0.3 cm low-attenuation lesions within the left  hepatic lobe, most consistent with benign etiology.  No acute process is  seen.    Biliary: No bile duct dilation.  Gallbladder is unremarkable.    Spleen: No mass. No splenomegaly.    Pancreas: No mass or duct dilation.    Adrenals: No mass.    Kidneys: Subcentimeter low-attenuation lesions within the upper pole of  each kidney, too small to fully characterize, benign etiology likely.  No  hydronephrosis or renal calculus.    GI tract: Nondistended large bowel at the hepatic flexure and descending  colon through to the sigmoid, with prominence of the wall, likely related  to lack of distention, wall thickening less likely.  No adjacent  inflammatory change.  Patient has had prior appendectomy. No evidence of  diverticulitis.    Lymph nodes: No abdominal or pelvic lymphadenopathy.    Mesentery/Peritoneum: No ascites or mass.    Retroperitoneum: No mass.    Vasculature:   - Abdominal aorta and iliac arteries: Atherosclerotic calcifications  without aneurysm.   - Celiac and SMA: Patent without stenosis.   - Portal venous system (SMV, splenic vein, portal vein and branches):  Patent.   - Hepatic veins: Patent.    Pelvis: No mass, ascites or fluid collection.    Bones/Soft Tissues: No significant finding.    Lower thorax: Unremarkable.    Localizer images: No additional findings.  Assessment/Plan   Diagnoses and all orders for this visit:  Right upper quadrant abdominal pain  -     Referral to Gastroenterology  -     NM hepatobiliary w cholecystokinin; Future  -     Esophagogastroduodenoscopy (EGD); Future  On physical examination she has right upper quadrant tenderness.  Previous results were reviewed with the patient which included a CT scan of abdominal pelvis, colonoscopy results.    If the above tests are normal then can consider pain management.   Call  with questions.

## 2024-06-24 NOTE — H&P (VIEW-ONLY)
Subjective   Patient ID: Charo Burgess is a 54 y.o. female who presents for Abdominal Pain (Pt states having severe left abdominal pain right under breast that is radiating around the rib cages to her back. Hx of ovarian cancer, EGD performed in 2020 (results printed). States she went to Dr. Dobson with concerns of severe pain. ).  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  Has been getting routine radiological surveillance which have been negative.  Follows up with Dr. Saleh at [facility].  Over the last 4 to 5 months she has been concerned about a soft tissue mass in the right upper quadrant which is causing pain radiating to the back.  She was seen in the oncology office for this and underwent colonoscopy which showed a descending colon polyp and was otherwise normal.  She also had a CT of the chest with IV contrast which did not show any acute abnormality.  No evidence of chest mass.  Denies any associated GI symptoms.  Recently had a CT scan of abdominal pelvis with IV contrast which was overall benign.        RUQ pain started 1 year ago and it is going into the back.   The pain can be quite severe and will stay for good 10-15 min.     Recent CT scan of the chest was negative.   Colonoscopy was in 2024 was negative.   Not associated with meals.     Previous hytrectomy.     Current Outpatient Medications on File Prior to Visit   Medication Sig Dispense Refill    atorvastatin (Lipitor) 10 mg tablet take 1 tablet by mouth once daily 90 tablet 0    diclofenac (Voltaren) 75 mg EC tablet Take 1 tablet (75 mg) by mouth 2 times a day as needed (pain). Do not crush, chew, or split. 60 tablet 3    exemestane (Aromasin) 25 mg tablet Take after a meal.  Try to take at the same time each day.      gabapentin (Neurontin) 300 mg capsule Take 1 capsule by mouth 4 times daily. 120 capsule 0    ondansetron (Zofran) 8 mg  tablet       oxyCODONE (Roxicodone) 10 mg immediate release tablet Take 1 tablet (10 mg) by mouth every 8 hours if needed for moderate pain (4 - 6) or severe pain (7 - 10). 90 tablet 0    pravastatin (Pravachol) 10 mg tablet Take 1 tablet (10 mg) by mouth once daily. 30 tablet 5    predniSONE (Deltasone) 10 mg tablet TID FOR 5 DAYS THEN BID FOR 3 DAYS THEN 1 DAILY 24 tablet 0    valACYclovir (Valtrex) 1 gram tablet Take 1 tablet (1,000 mg) by mouth 2 times a day.      zolpidem (Ambien) 5 mg tablet Take 1 tablet (5 mg) by mouth as needed at bedtime for sleep. 30 tablet 3    [DISCONTINUED] gabapentin (Neurontin) 300 mg capsule Take 1 capsule by mouth 4 times daily. 120 capsule 3    [DISCONTINUED] oxyCODONE (Roxicodone) 10 mg immediate release tablet Take 1 tablet (10 mg) by mouth every 8 hours if needed for moderate pain (4 - 6) or severe pain (7 - 10). 90 tablet 0     No current facility-administered medications on file prior to visit.        Review of Systems   Constitutional: Negative.    Respiratory: Negative.     Cardiovascular: Negative.    Gastrointestinal:  Positive for abdominal pain.   Endocrine: Negative.    Genitourinary: Negative.    Skin: Negative.    Neurological: Negative.        Objective   Physical Exam  Constitutional:       Appearance: Normal appearance.   HENT:      Head: Normocephalic and atraumatic.   Cardiovascular:      Rate and Rhythm: Normal rate and regular rhythm.      Pulses: Normal pulses.      Heart sounds: Normal heart sounds.   Pulmonary:      Effort: Pulmonary effort is normal.      Breath sounds: Normal breath sounds.   Abdominal:      General: Abdomen is flat. Bowel sounds are normal.      Palpations: Abdomen is soft.      Tenderness: There is abdominal tenderness.   Musculoskeletal:         General: Normal range of motion.      Cervical back: Normal range of motion.   Skin:     General: Skin is warm.   Neurological:      Mental Status: She is alert.       /78 (BP Location:  "Right arm, Patient Position: Sitting, BP Cuff Size: Large adult)   Pulse 64   Resp 18   Ht 1.676 m (5' 6\")   Wt 82.4 kg (181 lb 11.2 oz)   LMP  (LMP Unknown)   SpO2 97%   BMI 29.33 kg/m²      Lab Results   Component Value Date    WBC 6.6 03/29/2024    HGB 13.2 03/29/2024    HCT 41.0 03/29/2024    MCV 89 03/29/2024     03/29/2024           No lab exists for component: \"LABALBU\"    No results found for: \"AFP\"  Lab Results   Component Value Date    TSH 1.95 11/08/2022     Flexible Sigmoidoscopy  Order: [id number]  [facility]  Patient Name: Charo BURNS  Procedure Date: 3/5/2024 8:25 AM  MRN: [id number]  YOB: 1970  Age: 53  Gender: Female  Race: Unknown  Attending MD: Senait Blanco MD, [id number]  Procedure:             Colonoscopy  Referring MD:          JALEN MISHRA MD  Providers:       Senait Blanco MD, Tanisha Rich CRNA (Anesthetist)  Indications:           High risk colon cancer surveillance: Personal history                         of colonic polyps  Findings:              A 3 mm polyp was found in the descending colon. The                         polyp was sessile. The polyp was removed with a cold                         biopsy forceps. Resection and retrieval were complete.                         Verification of patient identification for the                         specimen was done by the nurse using the patient's                         name and birth date.                         Internal hemorrhoids were found during retroflexion.                         The hemorrhoids were Grade I (internal hemorrhoids                         that do not prolapse).  Patient Profile:       This is a 53 year old female. Refer to note in patient                         chart for documentation of history and physical.  Impression:            - One 3 mm polyp in the descending colon, removed with                         a cold biopsy forceps. " Resected and retrieved.                         - Internal hemorrhoids.  Recommendation:        - Patient has a contact number available for                         emergencies. The signs and symptoms of potential                         delayed complications were discussed with the patient.                         Return to normal activities tomorrow. Written                         discharge instructions were provided to the patient.                         - Resume previous diet.                         - Continue present medications.                         - Await pathology results.                         - Repeat colonoscopy is recommended for surveillance.                         The colonoscopy date will be determined after                         pathology results from today's exam become available                         for review.  Medicines:             Propofol per Anesthesia  Procedure:             Pre-Anesthesia Assessment:                         - Prior to the procedure, a History and Physical was                         performed, and patient medications and allergies were                         reviewed. The patient is competent. The risks and                         benefits of the procedure and the sedation options and                         risks were discussed with the patient. All questions                         were answered and informed consent was obtained.                         Patient identification and proposed procedure were                         verified by the physician, the nurse and the                         anesthetist in the procedure room. Mental Status                         Examination: alert and oriented. Airway Examination:                         normal oropharyngeal airway and neck mobility.                         Respiratory Examination: clear to auscultation. CV                         Examination: normal. Prophylactic Antibiotics: The                          patient does not require prophylactic antibiotics.                         Prior Anticoagulants: The patient has taken no                         anticoagulant or antiplatelet agents. ASA Grade                         Assessment: II - A patient with mild systemic disease.                         After reviewing the risks and benefits, the patient                         was deemed in satisfactory condition to undergo the                         procedure. The anesthesia plan was to use deep                         sedation / analgesia. Immediately prior to                         administration of medications, the patient was                         re-assessed for adequacy to receive sedatives. The                         heart rate, respiratory rate, oxygen saturations,                         blood pressure, adequacy of pulmonary ventilation, and                         response to care were monitored throughout the                         procedure. The physical status of the patient was                         re-assessed after the procedure.                         After I obtained informed consent, the scope was                         passed under direct vision. Throughout the procedure,                         the patient's blood pressure, pulse, and oxygen                         saturations were monitored continuously. Provation                         AI/GI Genius was used during withdrawal. The                         COLONOSCOPE was introduced through the anus and                         advanced to the cecum, identified by appendiceal                         orifice and ileocecal valve. The colonoscopy was                         performed without difficulty. The patient tolerated                         the procedure well. The quality of the bowel                         preparation was good. The ileocecal valve, appendiceal                         orifice, and rectum were  photographed.  Complications:         No immediate complications.  Procedure Code(s):     --- Professional ---                         67894  Diagnosis Code(s):     --- Professional ---                         Z86.010                         D12.4                         K64.0  CPT copyright 2021 [employer]. All rights reserved.  The codes documented in this report are preliminary and upon  review may  be revised to meet current compliance requirements.  Senait Blanco MD  3/5/2024 8:46:08 AM  This report has been signed electronically.  Number of Addenda: 0  Note Initiated On: 3/5/2024 8:25 AM  Exam End: --    Specimen Collected: 03/05/24 08:25 Last Resulted: 03/05/24 08:44   Received From: [facility]  Result Received: 03/07/24 04:52      CT abdomen pelvis w IV contrast  Order: [id number]  Impression    IMPRESSION:    Stable incidental findings as detailed above.  No acute abdominal or pelvic process is seen.                : PSCB    Transcribe Date/Time: Apr 15 2024  2:08P    Dictated by : SERJIO MANCINI MD    This examination was interpreted and the report reviewed and  electronically signed by:  SERJIO MANCINI MD on Apr 15 2024  2:18PM  EST  Narrative    * * *Final Report* * *    DATE OF EXAM: Apr 12 2024  1:25PM      LILLY   0530  -  CT ABD/PEL W IVCON  / ACCESSION #  [id number]    PROCEDURE REASON: Granulosa cell tumor of right ovary        * * * * Physician Interpretation * * * *     EXAMINATION:  CT ABDOMEN AND PELVIS WITH IV CONTRAST    CLINICAL HISTORY: Right ovarian tumor    TECHNIQUE: CT of the abdomen and pelvis was performed using standard  technique, scanning from just above the dome of the diaphragm to the  symphysis pubis.  MQ:  CTAP_3    Contrast:  IV:  100 ml of Omnipaque 350  Oral:  450 ml of Omni 240 10-25ml diluted with water    CT Radiation dose: Integrated Dose-length product (DLP) for this visit =    518 mGy*cm.  CT Dose Reduction Employed:  Automated exposure control(AEC) and iterative  recon    COMPARISON: None.      RESULT:    Liver: Stable 0.5 cm and 0.3 cm low-attenuation lesions within the left  hepatic lobe, most consistent with benign etiology.  No acute process is  seen.    Biliary: No bile duct dilation.  Gallbladder is unremarkable.    Spleen: No mass. No splenomegaly.    Pancreas: No mass or duct dilation.    Adrenals: No mass.    Kidneys: Subcentimeter low-attenuation lesions within the upper pole of  each kidney, too small to fully characterize, benign etiology likely.  No  hydronephrosis or renal calculus.    GI tract: Nondistended large bowel at the hepatic flexure and descending  colon through to the sigmoid, with prominence of the wall, likely related  to lack of distention, wall thickening less likely.  No adjacent  inflammatory change.  Patient has had prior appendectomy. No evidence of  diverticulitis.    Lymph nodes: No abdominal or pelvic lymphadenopathy.    Mesentery/Peritoneum: No ascites or mass.    Retroperitoneum: No mass.    Vasculature:   - Abdominal aorta and iliac arteries: Atherosclerotic calcifications  without aneurysm.   - Celiac and SMA: Patent without stenosis.   - Portal venous system (SMV, splenic vein, portal vein and branches):  Patent.   - Hepatic veins: Patent.    Pelvis: No mass, ascites or fluid collection.    Bones/Soft Tissues: No significant finding.    Lower thorax: Unremarkable.    Localizer images: No additional findings.  Assessment/Plan   Diagnoses and all orders for this visit:  Right upper quadrant abdominal pain  -     Referral to Gastroenterology  -     NM hepatobiliary w cholecystokinin; Future  -     Esophagogastroduodenoscopy (EGD); Future  On physical examination she has right upper quadrant tenderness.  Previous results were reviewed with the patient which included a CT scan of abdominal pelvis, colonoscopy results.    If the above tests are normal then can consider pain management.   Call  with questions.

## 2024-06-26 ENCOUNTER — APPOINTMENT (OUTPATIENT)
Dept: PRIMARY CARE | Facility: CLINIC | Age: 54
End: 2024-06-26
Payer: COMMERCIAL

## 2024-07-09 ENCOUNTER — ANESTHESIA (OUTPATIENT)
Dept: GASTROENTEROLOGY | Facility: HOSPITAL | Age: 54
End: 2024-07-09
Payer: COMMERCIAL

## 2024-07-09 ENCOUNTER — ANESTHESIA EVENT (OUTPATIENT)
Dept: GASTROENTEROLOGY | Facility: HOSPITAL | Age: 54
End: 2024-07-09
Payer: COMMERCIAL

## 2024-07-09 ENCOUNTER — HOSPITAL ENCOUNTER (OUTPATIENT)
Dept: GASTROENTEROLOGY | Facility: HOSPITAL | Age: 54
Setting detail: OUTPATIENT SURGERY
Discharge: HOME | End: 2024-07-09
Payer: COMMERCIAL

## 2024-07-09 VITALS
WEIGHT: 180 LBS | TEMPERATURE: 97.7 F | HEIGHT: 66 IN | HEART RATE: 73 BPM | RESPIRATION RATE: 18 BRPM | BODY MASS INDEX: 28.93 KG/M2 | OXYGEN SATURATION: 96 %

## 2024-07-09 DIAGNOSIS — R10.11 RIGHT UPPER QUADRANT ABDOMINAL PAIN: Primary | ICD-10-CM

## 2024-07-09 PROCEDURE — 3700000001 HC GENERAL ANESTHESIA TIME - INITIAL BASE CHARGE

## 2024-07-09 PROCEDURE — 2500000004 HC RX 250 GENERAL PHARMACY W/ HCPCS (ALT 636 FOR OP/ED): Performed by: ANESTHESIOLOGIST ASSISTANT

## 2024-07-09 PROCEDURE — 7100000009 HC PHASE TWO TIME - INITIAL BASE CHARGE

## 2024-07-09 PROCEDURE — 43239 EGD BIOPSY SINGLE/MULTIPLE: CPT | Performed by: INTERNAL MEDICINE

## 2024-07-09 PROCEDURE — 2500000005 HC RX 250 GENERAL PHARMACY W/O HCPCS: Performed by: ANESTHESIOLOGIST ASSISTANT

## 2024-07-09 PROCEDURE — 3700000002 HC GENERAL ANESTHESIA TIME - EACH INCREMENTAL 1 MINUTE

## 2024-07-09 PROCEDURE — 7100000010 HC PHASE TWO TIME - EACH INCREMENTAL 1 MINUTE

## 2024-07-09 RX ORDER — SODIUM CHLORIDE, SODIUM LACTATE, POTASSIUM CHLORIDE, CALCIUM CHLORIDE 600; 310; 30; 20 MG/100ML; MG/100ML; MG/100ML; MG/100ML
INJECTION, SOLUTION INTRAVENOUS CONTINUOUS PRN
Status: DISCONTINUED | OUTPATIENT
Start: 2024-07-09 | End: 2024-07-09

## 2024-07-09 RX ORDER — PROPOFOL 10 MG/ML
INJECTION, EMULSION INTRAVENOUS AS NEEDED
Status: DISCONTINUED | OUTPATIENT
Start: 2024-07-09 | End: 2024-07-09

## 2024-07-09 RX ORDER — SODIUM CHLORIDE, SODIUM LACTATE, POTASSIUM CHLORIDE, CALCIUM CHLORIDE 600; 310; 30; 20 MG/100ML; MG/100ML; MG/100ML; MG/100ML
20 INJECTION, SOLUTION INTRAVENOUS CONTINUOUS
Status: DISCONTINUED | OUTPATIENT
Start: 2024-07-09 | End: 2024-07-11 | Stop reason: HOSPADM

## 2024-07-09 RX ORDER — LIDOCAINE HYDROCHLORIDE 20 MG/ML
INJECTION, SOLUTION EPIDURAL; INFILTRATION; INTRACAUDAL; PERINEURAL AS NEEDED
Status: DISCONTINUED | OUTPATIENT
Start: 2024-07-09 | End: 2024-07-09

## 2024-07-09 ASSESSMENT — PAIN - FUNCTIONAL ASSESSMENT
PAIN_FUNCTIONAL_ASSESSMENT: 0-10
PAIN_FUNCTIONAL_ASSESSMENT: 0-10

## 2024-07-09 ASSESSMENT — PAIN SCALES - GENERAL
PAINLEVEL_OUTOF10: 0 - NO PAIN
PAINLEVEL_OUTOF10: 0 - NO PAIN

## 2024-07-09 NOTE — ANESTHESIA POSTPROCEDURE EVALUATION
Patient: Charo Burgess    Procedure Summary       Date: 07/09/24 [place] [facility] [place]    Anesthesia Start: 0759 Anesthesia Stop: 0833    Procedure: EGD Diagnosis:       Right upper quadrant abdominal pain      Right upper quadrant abdominal pain    Scheduled Providers: Nabor Lay MD Responsible Provider: Wendy Summers MD    Anesthesia Type: MAC ASA Status: 3            Anesthesia Type: MAC    Vitals Value Taken Time   /57 07/09/24 0833   Temp 36.5 07/09/24 0833   Pulse 70 07/09/24 0833   Resp 16 07/09/24 0833   SpO2 96 07/09/24 0833       Anesthesia Post Evaluation    Patient location during evaluation: PACU  Patient participation: complete - patient participated  Level of consciousness: awake and alert  Pain management: satisfactory to patient  Airway patency: patent  Cardiovascular status: acceptable  Respiratory status: acceptable, room air and spontaneous ventilation  Hydration status: acceptable  Postoperative Nausea and Vomiting: none        No notable events documented.

## 2024-07-09 NOTE — DISCHARGE INSTRUCTIONS

## 2024-07-09 NOTE — ANESTHESIA PREPROCEDURE EVALUATION
Patient: Charo Burgess    Procedure Information       Date/Time: 07/09/24 0750    Scheduled providers: Nabor Lay MD    Procedure: EGD    Location: [facility] [place]            Relevant Problems   Anesthesia (within normal limits)      Cardiac   (+) Hyperlipidemia      Musculoskeletal   (+) Chronic low back pain   (+) Medial epicondylitis of right elbow   (+) Muscle weakness of extremity   (+) Primary osteoarthritis involving multiple joints      GYN   (+) Ovarian cancer (Multi)      Hematologic   (+) Positive antinuclear antibody      Mental Health   (+) Continuous opioid dependence (Multi)       Clinical information reviewed:    Allergies  Meds               NPO Detail:  NPO/Void Status  Carbohydrate Drink Given Prior to Surgery? : N  Date of Last Liquid: 07/08/24  Time of Last Liquid: 2200  Date of Last Solid: 07/08/24  Time of Last Solid: 2200  Last Intake Type: Clear fluids  Time of Last Void: 0637         Physical Exam    Airway  Mallampati: I  TM distance: >3 FB  Neck ROM: full     Cardiovascular   Rhythm: regular  Rate: normal     Dental   (+) upper dentures     Pulmonary   Breath sounds clear to auscultation     Abdominal   Abdomen: soft             Anesthesia Plan    History of general anesthesia?: yes  History of complications of general anesthesia?: no    ASA 3     MAC     intravenous induction   Anesthetic plan and risks discussed with patient.    Plan discussed with CAA, CRNA and attending.

## 2024-07-11 ENCOUNTER — HOSPITAL ENCOUNTER (OUTPATIENT)
Dept: RADIOLOGY | Facility: HOSPITAL | Age: 54
Discharge: HOME | End: 2024-07-11
Payer: COMMERCIAL

## 2024-07-11 DIAGNOSIS — R10.11 RIGHT UPPER QUADRANT ABDOMINAL PAIN: ICD-10-CM

## 2024-07-11 DIAGNOSIS — R10.11 RIGHT UPPER QUADRANT ABDOMINAL PAIN: Primary | ICD-10-CM

## 2024-07-11 DIAGNOSIS — K44.9 HIATAL HERNIA: ICD-10-CM

## 2024-07-11 PROCEDURE — 3430000001 HC RX 343 DIAGNOSTIC RADIOPHARMACEUTICALS: Performed by: FAMILY MEDICINE

## 2024-07-11 PROCEDURE — 78227 HEPATOBIL SYST IMAGE W/DRUG: CPT

## 2024-07-11 PROCEDURE — A9537 TC99M MEBROFENIN: HCPCS | Performed by: FAMILY MEDICINE

## 2024-07-11 RX ORDER — KIT FOR THE PREPARATION OF TECHNETIUM TC 99M MEBROFENIN 45 MG/10ML
8 INJECTION, POWDER, LYOPHILIZED, FOR SOLUTION INTRAVENOUS
Status: COMPLETED | OUTPATIENT
Start: 2024-07-11 | End: 2024-07-11

## 2024-07-11 NOTE — PROGRESS NOTES
"Subjective   Patient ID: Charo Burgess is a 54 y.o. female who presents for Med Management, Insomnia, and Osteoarthritis.  HPI    Patient states that she believe she is developing an near inf in the left year. Patient c/o pain for the last week. She has tried OTC earache med with minimal relief. She would like you to check and send antibiotics if needed.     Patient states that she went to GI and a hernia in her abdomen was found.   Patient has NM hepatobiliary completed yesterday.  Her next appointment with GI is 9/12. Patient is on wait list for sooner appt.     Patient presents for chronic pain. Is currently taking oxycodone. Rates the pain a 8/10 over the past 7 days. Reports that the medication gives 50% pain control/relief. OARRS reviewed today, CSA signed 2/2/24. Last took last night.     Patient in office being seen for a follow up on Insomnia. Currently taking Ambien. Last took last night . Medication agreement signed on 3/26/24. . 100% managed with the medication. States there are no adverse effects.     BW done 3/29/24  Mammogram 3/11/24  Sigmoidoscopy 3-5-24     Taking current medications which were reviewed.  Problem list discussed.    Overall doing well.  Eating okay.  Staying active.    Has no other new problem /question.     ROS  Constitutional- No activity change. No appetite change.  Eyes- Denies vision changes.  Respiratory- No shortness of breath.  Cardiovascular- No palpitations. No chest pain.  GI- No nausea or vomiting. No diarrhea or constipation. Denies abdominal pain.  Musculoskeletal- Denies joint swelling.  Extremities- No edema.  Neurological- Denies headaches. Denies dizziness.  Skin- No rashes.  Psychiatric/Behavioral- Denies significant anxiety, or depressed mood.     Objective     /80   Pulse 58   Temp 36.2 °C (97.2 °F)   Resp 16   Ht 1.676 m (5' 6\")   Wt 82.5 kg (181 lb 12.8 oz)   LMP  (LMP Unknown)   SpO2 97%   BMI 29.34 kg/m²     Allergies   Allergen Reactions    " Codeine Hives       Constitutional-- Well-nourished.  No distress  Head- unremarkable.  Ears-dull left tympanic membrane mildly pink  Eyes- PERRL.  Conjunctiva normal.  Nose- Normal.  No rhinorrhea noted.  Throat- Oropharynx is clear and moist.  Neck- Supple with no thyromegaly.  No significant cervical adenopathy noted.  Pulmonary/Chest- Breath sounds normal with normal effort.  No wheezing.  Heart- Regular rate and rhythm.  No murmur.  Abdomen- Soft and non-tender.  No masses noted.  Musculoskeletal- Normal ROM.  No significant joint swelling  Extremities- No edema.   Neurological- Alert.  No noted deficits.  Skin- Warm.  No rashes.  Psychiatric/Behavioral- Mood and affect normal.  Behavior normal.     Assessment/Plan   1. Seromucinous otitis media of left ear  azithromycin (Zithromax) 250 mg tablet      2. Primary osteoarthritis involving multiple joints  oxyCODONE (Roxicodone) 10 mg immediate release tablet      3. Other hyperlipidemia        4. Primary insomnia        5. Continuous opioid dependence (Multi)        6. Chronic low back pain, unspecified back pain laterality, unspecified whether sciatica present  oxyCODONE (Roxicodone) 10 mg immediate release tablet      7. Pain of lower extremity, unspecified laterality  gabapentin (Neurontin) 300 mg capsule             Long talk. Treatment options reviewed.  Start antibiotic for otitis media and URI.  Over-the-counter cold medicine as needed for symptom relief  Reviewed most recent lab work with patient. Advised patient to remain up to date on routine maintenance and health screening.     Osteoarthritis controlled. Educated the patient on osteoarthritis care and management. Educated on muscle strength and exercise.  Understands to use least amount of pain medication to control her symptoms    Educated on insomnia and sleep hygiene.    Continue and take your medications as prescribed.    Health Maintenance issues discussed.    Importance of healthy diet and  regular exercise regimen discussed.    We will contact you with any test results ordered. If you do not hear from us, please contact.    Follow-up as instructed or sooner if any problems or symptoms do not resolve as expected.          Scribe Attestation  By signing my name below, Katelin LEGER Scribe   attest that this documentation has been prepared under the direction and in the presence of Sanjiv Dobson MD.

## 2024-07-12 ENCOUNTER — APPOINTMENT (OUTPATIENT)
Dept: PRIMARY CARE | Facility: CLINIC | Age: 54
End: 2024-07-12
Payer: COMMERCIAL

## 2024-07-12 VITALS
DIASTOLIC BLOOD PRESSURE: 80 MMHG | RESPIRATION RATE: 16 BRPM | HEART RATE: 58 BPM | HEIGHT: 66 IN | WEIGHT: 181.8 LBS | OXYGEN SATURATION: 97 % | SYSTOLIC BLOOD PRESSURE: 102 MMHG | TEMPERATURE: 97.2 F | BODY MASS INDEX: 29.22 KG/M2

## 2024-07-12 DIAGNOSIS — H65.92 SEROMUCINOUS OTITIS MEDIA OF LEFT EAR: Primary | ICD-10-CM

## 2024-07-12 DIAGNOSIS — F11.20 CONTINUOUS OPIOID DEPENDENCE (MULTI): ICD-10-CM

## 2024-07-12 DIAGNOSIS — M15.9 PRIMARY OSTEOARTHRITIS INVOLVING MULTIPLE JOINTS: ICD-10-CM

## 2024-07-12 DIAGNOSIS — M79.606 PAIN OF LOWER EXTREMITY, UNSPECIFIED LATERALITY: ICD-10-CM

## 2024-07-12 DIAGNOSIS — G89.29 CHRONIC LOW BACK PAIN, UNSPECIFIED BACK PAIN LATERALITY, UNSPECIFIED WHETHER SCIATICA PRESENT: ICD-10-CM

## 2024-07-12 DIAGNOSIS — F51.01 PRIMARY INSOMNIA: ICD-10-CM

## 2024-07-12 DIAGNOSIS — M54.50 CHRONIC LOW BACK PAIN, UNSPECIFIED BACK PAIN LATERALITY, UNSPECIFIED WHETHER SCIATICA PRESENT: ICD-10-CM

## 2024-07-12 DIAGNOSIS — E78.49 OTHER HYPERLIPIDEMIA: ICD-10-CM

## 2024-07-12 PROCEDURE — 3008F BODY MASS INDEX DOCD: CPT | Performed by: FAMILY MEDICINE

## 2024-07-12 PROCEDURE — 99213 OFFICE O/P EST LOW 20 MIN: CPT | Performed by: FAMILY MEDICINE

## 2024-07-12 PROCEDURE — 1036F TOBACCO NON-USER: CPT | Performed by: FAMILY MEDICINE

## 2024-07-12 RX ORDER — GABAPENTIN 300 MG/1
CAPSULE ORAL
Qty: 120 CAPSULE | Refills: 2 | Status: SHIPPED | OUTPATIENT
Start: 2024-07-12

## 2024-07-12 RX ORDER — OXYCODONE HYDROCHLORIDE 10 MG/1
10 TABLET ORAL EVERY 8 HOURS PRN
Qty: 90 TABLET | Refills: 0 | Status: SHIPPED | OUTPATIENT
Start: 2024-07-12 | End: 2024-08-11

## 2024-07-12 RX ORDER — AZITHROMYCIN 250 MG/1
TABLET, FILM COATED ORAL
Qty: 6 TABLET | Refills: 0 | Status: SHIPPED | OUTPATIENT
Start: 2024-07-12 | End: 2024-07-16

## 2024-07-12 ASSESSMENT — ANXIETY QUESTIONNAIRES
IF YOU CHECKED OFF ANY PROBLEMS ON THIS QUESTIONNAIRE, HOW DIFFICULT HAVE THESE PROBLEMS MADE IT FOR YOU TO DO YOUR WORK, TAKE CARE OF THINGS AT HOME, OR GET ALONG WITH OTHER PEOPLE: NOT DIFFICULT AT ALL
4. TROUBLE RELAXING: NOT AT ALL
7. FEELING AFRAID AS IF SOMETHING AWFUL MIGHT HAPPEN: NOT AT ALL
3. WORRYING TOO MUCH ABOUT DIFFERENT THINGS: NOT AT ALL
6. BECOMING EASILY ANNOYED OR IRRITABLE: NOT AT ALL
1. FEELING NERVOUS, ANXIOUS, OR ON EDGE: NOT AT ALL
2. NOT BEING ABLE TO STOP OR CONTROL WORRYING: NOT AT ALL
5. BEING SO RESTLESS THAT IT IS HARD TO SIT STILL: NOT AT ALL
GAD7 TOTAL SCORE: 0

## 2024-07-12 ASSESSMENT — PATIENT HEALTH QUESTIONNAIRE - PHQ9
2. FEELING DOWN, DEPRESSED OR HOPELESS: NOT AT ALL
SUM OF ALL RESPONSES TO PHQ9 QUESTIONS 1 & 2: 0
1. LITTLE INTEREST OR PLEASURE IN DOING THINGS: NOT AT ALL

## 2024-07-12 ASSESSMENT — ENCOUNTER SYMPTOMS
DEPRESSION: 0
LOSS OF SENSATION IN FEET: 0
OCCASIONAL FEELINGS OF UNSTEADINESS: 0

## 2024-07-13 DIAGNOSIS — M79.606 PAIN OF LOWER EXTREMITY, UNSPECIFIED LATERALITY: ICD-10-CM

## 2024-07-15 RX ORDER — GABAPENTIN 300 MG/1
CAPSULE ORAL
Qty: 120 CAPSULE | Refills: 5 | Status: SHIPPED | OUTPATIENT
Start: 2024-07-15

## 2024-07-16 LAB
LABORATORY COMMENT REPORT: NORMAL
PATH REPORT.COMMENTS IMP SPEC: NORMAL
PATH REPORT.FINAL DX SPEC: NORMAL
PATH REPORT.GROSS SPEC: NORMAL
PATH REPORT.RELEVANT HX SPEC: NORMAL
PATH REPORT.TOTAL CANCER: NORMAL

## 2024-07-23 NOTE — RESULT ENCOUNTER NOTE
During recent upper endoscopy biopsies were taken from the stomach.  Pathology results showed minimal inflammation however no infection was seen.  Please continue to follow-up in my office as already scheduled.  Sincerely,

## 2024-08-15 DIAGNOSIS — M54.50 CHRONIC LOW BACK PAIN, UNSPECIFIED BACK PAIN LATERALITY, UNSPECIFIED WHETHER SCIATICA PRESENT: ICD-10-CM

## 2024-08-15 DIAGNOSIS — M15.9 PRIMARY OSTEOARTHRITIS INVOLVING MULTIPLE JOINTS: ICD-10-CM

## 2024-08-15 DIAGNOSIS — G89.29 CHRONIC LOW BACK PAIN, UNSPECIFIED BACK PAIN LATERALITY, UNSPECIFIED WHETHER SCIATICA PRESENT: ICD-10-CM

## 2024-08-15 DIAGNOSIS — F51.01 PRIMARY INSOMNIA: ICD-10-CM

## 2024-08-15 RX ORDER — ZOLPIDEM TARTRATE 5 MG/1
5 TABLET ORAL NIGHTLY PRN
Qty: 30 TABLET | Refills: 0 | Status: SHIPPED | OUTPATIENT
Start: 2024-08-15

## 2024-08-15 RX ORDER — OXYCODONE HYDROCHLORIDE 10 MG/1
10 TABLET ORAL EVERY 8 HOURS PRN
Qty: 90 TABLET | Refills: 0 | Status: SHIPPED | OUTPATIENT
Start: 2024-08-15 | End: 2024-09-14

## 2024-08-15 NOTE — TELEPHONE ENCOUNTER
Rx Controlled Refill Request Telephone Encounter    Name: Charo Burgess  :  1970    Medication Name:   oxyCODONE (Roxicodone) 10 mg   Quantity (Optional):   90  Directions (Optional):   Take 1 tablet (10 mg) by mouth every 8 hours if needed for moderate pain (4 - 6) or severe pain (7 - 10).     Medication Name:   zolpidem (Ambien) 5 mg   Quantity (Optional):   30 refill: 3  Directions (Optional):   Take 1 tablet (5 mg) by mouth as needed at bedtime for sleep.     LAST DRUG SCREEN:     3/26/24  LAST MED CONTRACT:        Specific Pharmacy location:    [id number] [place] [id number] AT [address]     Date of last appointment:    24  Date of next appointment:    10/24

## 2024-08-28 ENCOUNTER — TELEPHONE (OUTPATIENT)
Dept: PRIMARY CARE | Facility: CLINIC | Age: 54
End: 2024-08-28
Payer: COMMERCIAL

## 2024-08-28 DIAGNOSIS — U07.1 COVID: Primary | ICD-10-CM

## 2024-08-28 RX ORDER — NIRMATRELVIR AND RITONAVIR 300-100 MG
3 KIT ORAL 2 TIMES DAILY
Qty: 1 DOSE PACK | Refills: 0 | Status: SHIPPED | OUTPATIENT
Start: 2024-08-28

## 2024-08-28 NOTE — TELEPHONE ENCOUNTER
Sanjiv Dobson MD  [id number] [place] [id number] Clinical Support Staff2 minutes ago (12:43 PM)       Please let her know I sent in the antiviral COVID medicine for her to take.  I would stop the antibiotic I prescribed.  She may resume the antibiotic if symptoms do not resolve next week.  Please let her know

## 2024-08-28 NOTE — TELEPHONE ENCOUNTER
Patient tested positive this morning for Covid , wants to know if you will call something in to [facility] [address] [place].   [phone] trung #

## 2024-09-12 ENCOUNTER — APPOINTMENT (OUTPATIENT)
Dept: GASTROENTEROLOGY | Facility: CLINIC | Age: 54
End: 2024-09-12
Payer: COMMERCIAL

## 2024-09-12 VITALS
SYSTOLIC BLOOD PRESSURE: 122 MMHG | OXYGEN SATURATION: 98 % | BODY MASS INDEX: 28.12 KG/M2 | RESPIRATION RATE: 18 BRPM | HEART RATE: 74 BPM | WEIGHT: 175 LBS | DIASTOLIC BLOOD PRESSURE: 78 MMHG | HEIGHT: 66 IN

## 2024-09-12 DIAGNOSIS — F51.01 PRIMARY INSOMNIA: ICD-10-CM

## 2024-09-12 DIAGNOSIS — G89.29 CHRONIC LOW BACK PAIN, UNSPECIFIED BACK PAIN LATERALITY, UNSPECIFIED WHETHER SCIATICA PRESENT: ICD-10-CM

## 2024-09-12 DIAGNOSIS — M15.9 PRIMARY OSTEOARTHRITIS INVOLVING MULTIPLE JOINTS: ICD-10-CM

## 2024-09-12 DIAGNOSIS — R10.11 RIGHT UPPER QUADRANT ABDOMINAL PAIN: Primary | ICD-10-CM

## 2024-09-12 DIAGNOSIS — M54.50 CHRONIC LOW BACK PAIN, UNSPECIFIED BACK PAIN LATERALITY, UNSPECIFIED WHETHER SCIATICA PRESENT: ICD-10-CM

## 2024-09-12 PROCEDURE — 3008F BODY MASS INDEX DOCD: CPT | Performed by: INTERNAL MEDICINE

## 2024-09-12 PROCEDURE — 99214 OFFICE O/P EST MOD 30 MIN: CPT | Performed by: INTERNAL MEDICINE

## 2024-09-12 RX ORDER — ZOLPIDEM TARTRATE 5 MG/1
5 TABLET ORAL NIGHTLY PRN
Qty: 30 TABLET | Refills: 0 | Status: SHIPPED | OUTPATIENT
Start: 2024-09-12

## 2024-09-12 RX ORDER — OXYCODONE HYDROCHLORIDE 10 MG/1
10 TABLET ORAL EVERY 8 HOURS PRN
Qty: 90 TABLET | Refills: 0 | Status: SHIPPED | OUTPATIENT
Start: 2024-09-12 | End: 2024-10-12

## 2024-09-12 NOTE — PATIENT INSTRUCTIONS
Chronic abdominal pain likely functional v/s nerve damage from prior Chemotherapy or Surgery associated adhesions.   Continue with pain management.   Follow up with me as needed,   Call with questions.

## 2024-09-12 NOTE — PROGRESS NOTES
Subjective   Patient ID: Charo Burgess is a 54 y.o. female who presents for Results (Review results, patient states still having symptoms of abdominal pain, constipation, heart burn).  HPI    Follow up after the procedure.   EGD Normal. Pathology Preeti;/     Still has abdominal pain.  H/O Ctx for granulosa cell tumor;.  CT scan 04/24 for follow up of the tumor : negative for GI cause of pain.   HIDA scan : GB working fine. 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  Has been getting routine radiological surveillance which have been negative.  Follows up with Dr. Saleh at [facility].  Over the last 4 to 5 months she has been concerned about a soft tissue mass in the right upper quadrant which is causing pain radiating to the back.  She was seen in the oncology office for this and underwent colonoscopy which showed a descending colon polyp and was otherwise normal.  She also had a CT of the chest with IV contrast which did not show any acute abnormality.  No evidence of chest mass.  Denies any associated GI symptoms.  Recently had a CT scan of abdominal pelvis with IV contrast which was overall benign.          RUQ pain started 1 year ago and it is going into the back.   The pain can be quite severe and will stay for good 10-15 min.     A. STOMACH BODY/CORPUS BIOPSY:   --Mild chronic gastritis and slight foveolar hyperplasia with mild mucosal capillary dilatation/congestion  --Negative for intestinal metaplasia or dysplasia   --No H. pylori-like microorganisms are identified on routine H&E stained sections  Current Outpatient Medications on File Prior to Visit   Medication Sig Dispense Refill    exemestane (Aromasin) 25 mg tablet Take after a meal.  Try to take at the same time each day.      gabapentin (Neurontin) 300 mg capsule TAKE 1 CAPSULE BY MOUTH FOUR  TIMES DAILY 120 capsule 5    pravastatin (Pravachol) 10 mg tablet Take 1 tablet (10 mg) by mouth once daily. 30 tablet 5     No current facility-administered medications on file prior to visit.        Review of Systems   Constitutional:  Negative for chills, fever and unexpected weight change.   HENT:  Negative for congestion and trouble swallowing.    Respiratory:  Negative for cough, shortness of breath and wheezing.    Cardiovascular:  Negative for chest pain.   Gastrointestinal:  Positive for abdominal pain. Negative for abdominal distention, constipation, diarrhea, nausea and vomiting.   Genitourinary:  Negative for difficulty urinating.   Musculoskeletal:  Negative for arthralgias and joint swelling.   Skin:  Negative for color change.   Neurological:  Negative for dizziness, speech difficulty, light-headedness and headaches.   Psychiatric/Behavioral:  Negative for confusion and sleep disturbance.        Objective   Physical Exam  Constitutional:       General: She is awake.      Appearance: Normal appearance.   HENT:      Head: Normocephalic and atraumatic.      Nose: Nose normal.      Mouth/Throat:      Mouth: Mucous membranes are moist.   Eyes:      Pupils: Pupils are equal, round, and reactive to light.   Neck:      Thyroid: No thyroid mass.      Trachea: Phonation normal.   Cardiovascular:      Rate and Rhythm: Normal rate and regular rhythm.      Heart sounds: Normal heart sounds. No murmur heard.     No gallop.   Pulmonary:      Effort: Pulmonary effort is normal. No respiratory distress.      Breath sounds: Normal air entry. No decreased breath sounds, wheezing, rhonchi or rales.   Abdominal:      General: Bowel sounds are normal. There is no distension.      Palpations: Abdomen is soft.      Tenderness: There is no abdominal tenderness.   Musculoskeletal:      Cervical back: Neck supple.      Right lower leg: No edema.      Left lower leg: No edema.   Skin:     General: Skin is warm.      Capillary  "Refill: Capillary refill takes less than 2 seconds.   Neurological:      General: No focal deficit present.      Mental Status: She is alert and oriented to person, place, and time. Mental status is at baseline.      Cranial Nerves: Cranial nerves 2-12 are intact.      Motor: Motor function is intact.   Psychiatric:         Attention and Perception: Attention and perception normal.         Mood and Affect: Mood normal.         Speech: Speech normal.         Behavior: Behavior normal.       /78 (BP Location: Right arm, Patient Position: Sitting, BP Cuff Size: Large adult)   Pulse 74   Resp 18   Ht 1.676 m (5' 6\")   Wt 79.4 kg (175 lb)   LMP  (LMP Unknown)   SpO2 98%   BMI 28.25 kg/m²      Lab Results   Component Value Date    WBC 6.6 03/29/2024    HGB 13.2 03/29/2024    HCT 41.0 03/29/2024    MCV 89 03/29/2024     03/29/2024           No lab exists for component: \"LABALBU\"    No results found for: \"AFP\"  Lab Results   Component Value Date    TSH 1.95 11/08/2022     Esophagogastroduodenoscopy (EGD)  Order# [id number]  Reading physician: Nabor Lay MD Ordering provider: Nabor Lay MD Study date: 7/9/24     Result Information    Status: Final result (Exam End: 7/9/2024 08:23) Provider Status: Open     Result Text    Result Text   Impression  The duodenal bulb and 2nd part of the duodenum appeared normal.  The cardia, fundus of the stomach and body of the stomach appeared normal. Performed random biopsy to rule out H. pylori.  5 cm hiatal hernia        Findings  The duodenal bulb and 2nd part of the duodenum appeared normal.  The cardia, fundus of the stomach and body of the stomach appeared normal. Performed random biopsy using biopsy forceps to rule out H. pylori.  5 cm hiatal hernia        Recommendation    Await pathology results     At this time do not think the hiatal hernia is the cause of pain, will reassess based on HIDA scan results.  Follow-up in my office in 6 to 8 " weeks.  Lifestyle modifications to reduce acid reflux.              Assessment/Plan   Diagnoses and all orders for this visit:  Right upper quadrant abdominal pain  No clear GI etiology identified for this pain. ? MSK v/s functional.   5cm Hiatal hernia was seen on the EGD however does not appear to be the cause.   Recommend continue to follow up with Pain management.   Follow up in the GI office as needed.

## 2024-09-12 NOTE — TELEPHONE ENCOUNTER
MEDICATION PENDED  Rx Controlled Refill Request Telephone Encounter    Name: Charo Burgess  :  1970    Medication Name:     oxyCODONE (Roxicodone) 10 mg immediate release tablet [[id number]]    Order Details  Dose: 10 mg Route: oral Frequency: Every 8 hours PRN for moderate pain (4 - 6), severe pain (7 - 10)   Dispense Quantity: 90 tablet Refills: 0    Note to Pharmacy: 30 DAY SUPPLY         Sig: Take 1 tablet (10 mg) by mouth every 8 hours if needed for moderate pain (4 - 6) or severe pain (7 - 10).     Medication Name:  zolpidem (Ambien) 5 mg tablet [[id number]]    Order Details  Dose: 5 mg Route: oral Frequency: Nightly PRN for sleep   Dispense Quantity: 30 tablet Refills: 0    Note to Pharmacy: *30 DAY SUPPLY*         Sig: Take 1 tablet (5 mg) by mouth as needed at bedtime for sleep.        ALLERGIES:    codeine    LAST DRUG SCREEN:     24  LAST MED CONTRACT:    24    Specific Pharmacy location:      [id number] [address] [place] [address] [facility] [address]       Date of last appointment:    24  Date of next appointment:    10/11/24    Best number to reach patient:    [phone]

## 2024-10-11 ENCOUNTER — TELEPHONE (OUTPATIENT)
Dept: PRIMARY CARE | Facility: CLINIC | Age: 54
End: 2024-10-11

## 2024-10-11 ENCOUNTER — APPOINTMENT (OUTPATIENT)
Dept: PRIMARY CARE | Facility: CLINIC | Age: 54
End: 2024-10-11
Payer: COMMERCIAL

## 2024-10-11 VITALS
DIASTOLIC BLOOD PRESSURE: 70 MMHG | SYSTOLIC BLOOD PRESSURE: 120 MMHG | HEIGHT: 66 IN | RESPIRATION RATE: 18 BRPM | WEIGHT: 181 LBS | BODY MASS INDEX: 29.09 KG/M2

## 2024-10-11 DIAGNOSIS — G62.0 DRUG-INDUCED POLYNEUROPATHY (MULTI): ICD-10-CM

## 2024-10-11 DIAGNOSIS — M54.50 CHRONIC LOW BACK PAIN, UNSPECIFIED BACK PAIN LATERALITY, UNSPECIFIED WHETHER SCIATICA PRESENT: ICD-10-CM

## 2024-10-11 DIAGNOSIS — G89.29 CHRONIC LOW BACK PAIN, UNSPECIFIED BACK PAIN LATERALITY, UNSPECIFIED WHETHER SCIATICA PRESENT: ICD-10-CM

## 2024-10-11 DIAGNOSIS — F51.01 PRIMARY INSOMNIA: Primary | ICD-10-CM

## 2024-10-11 DIAGNOSIS — F11.20 CONTINUOUS OPIOID DEPENDENCE (MULTI): ICD-10-CM

## 2024-10-11 DIAGNOSIS — M15.0 PRIMARY OSTEOARTHRITIS INVOLVING MULTIPLE JOINTS: ICD-10-CM

## 2024-10-11 DIAGNOSIS — E78.49 OTHER HYPERLIPIDEMIA: ICD-10-CM

## 2024-10-11 PROCEDURE — 3008F BODY MASS INDEX DOCD: CPT | Performed by: FAMILY MEDICINE

## 2024-10-11 PROCEDURE — 1036F TOBACCO NON-USER: CPT | Performed by: FAMILY MEDICINE

## 2024-10-11 PROCEDURE — 99213 OFFICE O/P EST LOW 20 MIN: CPT | Performed by: FAMILY MEDICINE

## 2024-10-11 RX ORDER — DICLOFENAC SODIUM 75 MG/1
75 TABLET, DELAYED RELEASE ORAL 2 TIMES DAILY PRN
Qty: 60 TABLET | Refills: 3 | Status: SHIPPED | OUTPATIENT
Start: 2024-10-11 | End: 2025-10-11

## 2024-10-11 RX ORDER — OXYCODONE HYDROCHLORIDE 10 MG/1
10 TABLET ORAL EVERY 8 HOURS PRN
Qty: 90 TABLET | Refills: 0 | Status: SHIPPED | OUTPATIENT
Start: 2024-10-11 | End: 2024-11-10

## 2024-10-11 RX ORDER — OXYCODONE HYDROCHLORIDE 10 MG/1
10 TABLET ORAL EVERY 8 HOURS PRN
Qty: 90 TABLET | Refills: 0 | Status: SHIPPED | OUTPATIENT
Start: 2024-10-11 | End: 2024-10-11 | Stop reason: SDUPTHER

## 2024-10-11 RX ORDER — ZOLPIDEM TARTRATE 5 MG/1
5 TABLET ORAL NIGHTLY PRN
Qty: 30 TABLET | Refills: 5 | Status: SHIPPED | OUTPATIENT
Start: 2024-10-11

## 2024-10-11 NOTE — TELEPHONE ENCOUNTER
SPOKE WITH PHARMACY THEY HAVE THE PINK ONES IN STOCK AND ARE JUST WAITING ON THE INSURANCE TO CALL BACK AND APPROVE THE OVER RIDE.

## 2024-10-11 NOTE — TELEPHONE ENCOUNTER
MD Marlee Boone; Do [id number] [facility] Cler[facility]1 hour ago (12:22 PM)       Please let the pharmacy know it is okay to refill and replace the pills she received today with the pink oxycodone.  I resent the prescription with those instructions.  Thanks

## 2024-10-11 NOTE — TELEPHONE ENCOUNTER
Sanjiv Dobson MD  You; Do [id number] [facility] Cler[facility]2 hours ago (12:22 PM)       Please let the pharmacy know it is okay to refill and replace the pills she received today with the pink oxycodone.  I resent the prescription with those instructions.  Thanks

## 2024-10-11 NOTE — TELEPHONE ENCOUNTER
PATIENT CALLED - YOU SENT OVER HER OXYCODONE 10 MG TO PHARMACY TODAY - PATIENT PICKED UP PILLS AND IMMEDIATELY NOTICED THAT THEY GAVE HER THE WHITE OXYCODONE - SHE CANNOT TAKE THOSE, THEY MAKE HER NAUSEATED - THEY HAVE PINK OXYCODONE THAT THEY USUALLY GIVE HER as WELL - PHARMACY WILL NOT TAKE THE WHITE OXYCODONE PILLS BACK BECAUSE SHE LEFT THE STORE WITH THEM - CALLED AND SPOKE TO THE PHARMACIST - THEY STATE THAT IF WE CALL IN ANOTHER PRESCRIPTION OF THIS MEDICATION, THAT THEY WOULD FILL IT BUT YOU WOULD HAVE TO NOTE ON THE PRESCRIPTION THAT IT IS OK TO FILL AND TO GIVE THE PATIENT THE PINK OXYCODONE - THEY ALSO REPORT THAT THEY CANNOT TAKE THIS MEDICATION BACK, BUT THEY WILL WATCH HER PUT IT IN A MEDICATION DROP BOX THEY HAVE IN THEIR STORE FOR MEDICATION THAT IS NOT NEEDED - PLEASE ADVISE.

## 2024-10-11 NOTE — PROGRESS NOTES
"Subjective   Patient ID: Charo Burgess is a 54 y.o. female who presents for Pain and Insomnia.  HPI    Patient presents for chronic pain. Is currently taking percocet. Rates the pain a 8/10 over the past 7 days. Reports that the medication gives 80% pain control/relief. OARRS reviewed today, CSA 2/2/24. Last took yesterday.     Patient in office being seen for a follow up on Insomnia. Currently taking Ambien. Last took last night. Medication agreement signed on 2/2/24. Reports that the medication is working 8/10. 80% managed with the medication. States there are no adverse effects.     Declines flu vaccine      Taking current medications which were reviewed.  Problem list discussed.    Overall doing well.  Eating okay.  Staying active.    Has no other new problem /question.     ROS  Constitutional- No activity change. No appetite change.  Eyes- Denies vision changes.  Respiratory- No shortness of breath.  Cardiovascular- No palpitations. No chest pain.  GI- No nausea or vomiting. No diarrhea or constipation. Denies abdominal pain.  Musculoskeletal- Denies joint swelling.  Extremities- No edema.  Neurological- Denies headaches. Denies dizziness.  Psychiatric/Behavioral- Denies significant anxiety, or depressed mood.     Objective     /70   Resp 18   Ht 1.676 m (5' 6\")   Wt 82.1 kg (181 lb)   LMP  (LMP Unknown)   BMI 29.21 kg/m²     Allergies   Allergen Reactions    Codeine Hives       Constitutional-- Well-nourished.  No distress  Head- unremarkable.  Eyes- PERRL.  Conjunctiva normal.  Nose- Normal.  No rhinorrhea noted.  Throat- Oropharynx is clear and moist.  Neck- Supple with no thyromegaly.  No significant cervical adenopathy noted.  Pulmonary/Chest- Breath sounds normal with normal effort.  No wheezing.  Heart- Regular rate and rhythm.  No murmur.  Abdomen- Soft and non-tender.  No masses noted.  Musculoskeletal- Normal ROM.  No significant joint swelling  Extremities- No edema.   Neurological- Alert. "  No noted deficits.  Skin- Warm.    Psychiatric/Behavioral- Mood and affect normal.  Behavior normal.     Assessment/Plan   1. Primary insomnia  zolpidem (Ambien) 5 mg tablet      2. Other hyperlipidemia        3. Primary osteoarthritis involving multiple joints  diclofenac (Voltaren) 75 mg EC tablet    DISCONTINUED: oxyCODONE (Roxicodone) 10 mg immediate release tablet      4. Chronic low back pain, unspecified back pain laterality, unspecified whether sciatica present  DISCONTINUED: oxyCODONE (Roxicodone) 10 mg immediate release tablet      5. Continuous opioid dependence (Multi)        6. Drug-induced polyneuropathy (Multi)               Long talk. Treatment options reviewed.    Reviewed most recent lab work with patient. Advised patient to remain up to date on routine maintenance and health screening.  Maintain appointments with specialists as scheduled.  Advised patient to remain up to date on immunizations.     Osteoarthritis controlled. Educated the patient on osteoarthritis care and management. Educated on muscle strength and exercise.  Understands to use least amount of pain medication to control her symptoms.    Insomnia good control.  Understands to not take her sleeping pill within 4 hours of using her oxycodone.    Discussed importance of natural sources of nutrition.  Advised patient to consume vegetables, salads, fruits, nuts, and proteins such as fish and chicken.  Discussed portion control.      Discussed the importance of routine stretching and exercise.     Continue and take your medications as prescribed.    Health Maintenance issues discussed.      Follow-up as instructed or sooner if any problems or symptoms do not resolve as expected.      Scribe Attestation  By signing my name below, Katelin LEGER Scribe   attest that this documentation has been prepared under the direction and in the presence of Sanjiv Dobson MD.

## 2024-10-17 ENCOUNTER — OFFICE VISIT (OUTPATIENT)
Dept: PRIMARY CARE | Facility: CLINIC | Age: 54
End: 2024-10-17
Payer: COMMERCIAL

## 2024-10-17 ENCOUNTER — TELEPHONE (OUTPATIENT)
Dept: PRIMARY CARE | Facility: CLINIC | Age: 54
End: 2024-10-17
Payer: COMMERCIAL

## 2024-10-17 VITALS
RESPIRATION RATE: 16 BRPM | OXYGEN SATURATION: 99 % | WEIGHT: 178.2 LBS | HEIGHT: 66 IN | HEART RATE: 97 BPM | SYSTOLIC BLOOD PRESSURE: 122 MMHG | TEMPERATURE: 97.7 F | BODY MASS INDEX: 28.64 KG/M2 | DIASTOLIC BLOOD PRESSURE: 80 MMHG

## 2024-10-17 DIAGNOSIS — H65.02 ACUTE SEROUS OTITIS MEDIA OF LEFT EAR, RECURRENCE NOT SPECIFIED: Primary | ICD-10-CM

## 2024-10-17 PROBLEM — M79.671 PAIN OF BOTH HEELS: Status: RESOLVED | Noted: 2023-02-17 | Resolved: 2024-10-17

## 2024-10-17 PROBLEM — M25.521 RIGHT ELBOW PAIN: Status: RESOLVED | Noted: 2023-02-17 | Resolved: 2024-10-17

## 2024-10-17 PROBLEM — M77.01 MEDIAL EPICONDYLITIS OF RIGHT ELBOW: Status: RESOLVED | Noted: 2023-02-17 | Resolved: 2024-10-17

## 2024-10-17 PROBLEM — M25.531 RIGHT WRIST PAIN: Status: RESOLVED | Noted: 2023-02-17 | Resolved: 2024-10-17

## 2024-10-17 PROBLEM — M79.672 PAIN OF BOTH HEELS: Status: RESOLVED | Noted: 2023-02-17 | Resolved: 2024-10-17

## 2024-10-17 PROBLEM — L98.9 BUMPS ON SKIN: Status: RESOLVED | Noted: 2023-02-17 | Resolved: 2024-10-17

## 2024-10-17 PROBLEM — M79.642 PAIN IN BOTH HANDS: Status: RESOLVED | Noted: 2023-02-17 | Resolved: 2024-10-17

## 2024-10-17 PROBLEM — M79.641 PAIN IN BOTH HANDS: Status: RESOLVED | Noted: 2023-02-17 | Resolved: 2024-10-17

## 2024-10-17 PROBLEM — M79.645 PAIN OF LEFT THUMB: Status: RESOLVED | Noted: 2023-02-17 | Resolved: 2024-10-17

## 2024-10-17 PROBLEM — M25.579 ANKLE PAIN: Status: RESOLVED | Noted: 2023-02-17 | Resolved: 2024-10-17

## 2024-10-17 PROCEDURE — 1036F TOBACCO NON-USER: CPT | Performed by: NURSE PRACTITIONER

## 2024-10-17 PROCEDURE — 99213 OFFICE O/P EST LOW 20 MIN: CPT | Performed by: NURSE PRACTITIONER

## 2024-10-17 PROCEDURE — 3008F BODY MASS INDEX DOCD: CPT | Performed by: NURSE PRACTITIONER

## 2024-10-17 RX ORDER — AZITHROMYCIN 250 MG/1
TABLET, FILM COATED ORAL
Qty: 6 TABLET | Refills: 0 | Status: SHIPPED | OUTPATIENT
Start: 2024-10-17 | End: 2024-10-22

## 2024-10-17 RX ORDER — FLUTICASONE PROPIONATE 50 MCG
1 SPRAY, SUSPENSION (ML) NASAL DAILY
Qty: 16 G | Refills: 1 | Status: SHIPPED | OUTPATIENT
Start: 2024-10-17 | End: 2025-10-17

## 2024-10-17 ASSESSMENT — ENCOUNTER SYMPTOMS
CHILLS: 0
ACTIVITY CHANGE: 0
APPETITE CHANGE: 0
NECK STIFFNESS: 0
EYE REDNESS: 0
WHEEZING: 0
CHEST TIGHTNESS: 0
HEADACHES: 1
COUGH: 0
ABDOMINAL PAIN: 0
PALPITATIONS: 0
NECK PAIN: 1
SHORTNESS OF BREATH: 0
RHINORRHEA: 1
ARTHRALGIAS: 0
DIARRHEA: 0
SORE THROAT: 0
FATIGUE: 0
FEVER: 0
VOMITING: 0
DIAPHORESIS: 0
MYALGIAS: 0
EYE DISCHARGE: 0
SINUS PRESSURE: 0

## 2024-10-17 ASSESSMENT — PATIENT HEALTH QUESTIONNAIRE - PHQ9
SUM OF ALL RESPONSES TO PHQ9 QUESTIONS 1 AND 2: 0
2. FEELING DOWN, DEPRESSED OR HOPELESS: NOT AT ALL
1. LITTLE INTEREST OR PLEASURE IN DOING THINGS: NOT AT ALL

## 2024-10-17 NOTE — TELEPHONE ENCOUNTER
PT CALLED IN WITH COMPLAINTS OF A SEVERE EAR ACHE AND POSSIBLE INFECTION, SHE SAID THAT SHE HAS BEEN GIVEN THE Z PACK FOR IT IN THE PAST AND IT HAS HELPED.    PLEASE ADVISE

## 2024-10-17 NOTE — TELEPHONE ENCOUNTER
Yahaira Redd, APRN-CNP, DNP  Do [id number] [place] [address] Clinical Support Staff; Do [id number] [place] [address] [id number] minutes ago (10:29 AM)     SP  Please offer her an in person (preferred so I can look in her ears) or virtual appointment with me today.  Let me know if she is not able to do that.    Thanks,  Yahaira

## 2024-10-17 NOTE — PROGRESS NOTES
"Subjective   Patient ID: Charo Burgess is a 54 y.o. female who presents for Earache.    Earache   There is pain in the left ear. This is a new problem. The current episode started in the past 7 days. The problem occurs constantly. The problem has been gradually worsening. There has been no fever. The pain is at a severity of 8/10. Associated symptoms include headaches, hearing loss, neck pain and rhinorrhea (occasional). Pertinent negatives include no abdominal pain, coughing, diarrhea, ear discharge, rash, sore throat or vomiting. She has tried ear drops for the symptoms. The treatment provided no relief. There is no history of a chronic ear infection, hearing loss or a tympanostomy tube.     The patient's allergies, medications, and past medical/surgical/family history were reviewed with them today and updated as indicated.    Review of Systems   Constitutional:  Negative for activity change, appetite change, chills, diaphoresis, fatigue and fever.   HENT:  Positive for ear pain, hearing loss, postnasal drip, rhinorrhea (occasional) and sneezing. Negative for congestion, ear discharge, sinus pressure and sore throat.    Eyes:  Negative for discharge and redness.   Respiratory:  Negative for cough, chest tightness, shortness of breath and wheezing.    Cardiovascular:  Negative for chest pain, palpitations and leg swelling.   Gastrointestinal:  Negative for abdominal pain, diarrhea and vomiting.   Musculoskeletal:  Positive for neck pain. Negative for arthralgias, myalgias and neck stiffness.   Skin:  Negative for rash.   Neurological:  Positive for headaches.      Objective   Vital Signs: /80   Pulse 97   Temp 36.5 °C (97.7 °F) (Tympanic)   Resp 16   Ht 1.676 m (5' 6\")   Wt 80.8 kg (178 lb 3.2 oz)   LMP  (LMP Unknown)   SpO2 99%   BMI 28.76 kg/m²     Physical Exam  Vitals and nursing note reviewed.   Constitutional:       General: She is not in acute distress.     Appearance: Normal appearance. She is " not ill-appearing.   HENT:      Head: Normocephalic and atraumatic.      Right Ear: Ear canal and external ear normal. No drainage or swelling. No middle ear effusion. No mastoid tenderness. Tympanic membrane is not erythematous, retracted or bulging.      Left Ear: Ear canal and external ear normal. No drainage or swelling. A middle ear effusion is present. No mastoid tenderness. Tympanic membrane is bulging. Tympanic membrane is not erythematous or retracted.      Nose: No congestion or rhinorrhea.      Right Sinus: Maxillary sinus tenderness present. No frontal sinus tenderness.      Left Sinus: Maxillary sinus tenderness present. No frontal sinus tenderness.      Mouth/Throat:      Mouth: Mucous membranes are moist.      Tongue: No lesions.      Palate: No mass and lesions.      Pharynx: Uvula midline. Posterior oropharyngeal erythema and postnasal drip present. No oropharyngeal exudate or uvula swelling.      Tonsils: No tonsillar exudate or tonsillar abscesses.   Eyes:      General:         Right eye: No discharge.         Left eye: No discharge.      Extraocular Movements: Extraocular movements intact.      Conjunctiva/sclera: Conjunctivae normal.      Pupils: Pupils are equal, round, and reactive to light.   Cardiovascular:      Rate and Rhythm: Normal rate and regular rhythm.      Heart sounds: No murmur heard.  Pulmonary:      Effort: Pulmonary effort is normal. No respiratory distress.   Musculoskeletal:      Right lower leg: No edema.      Left lower leg: No edema.   Lymphadenopathy:      Head:      Right side of head: No submandibular, preauricular, posterior auricular or occipital adenopathy.      Left side of head: No submandibular, preauricular, posterior auricular or occipital adenopathy.      Cervical: Cervical adenopathy present.      Right cervical: No superficial, deep or posterior cervical adenopathy.     Left cervical: Superficial cervical adenopathy present. No deep or posterior cervical  adenopathy.      Upper Body:      Right upper body: No supraclavicular adenopathy.      Left upper body: No supraclavicular adenopathy.   Skin:     General: Skin is warm and dry.      Coloration: Skin is not jaundiced.      Findings: No erythema or rash.   Neurological:      General: No focal deficit present.      Mental Status: She is alert. Mental status is at baseline.   Psychiatric:         Mood and Affect: Mood normal.         Behavior: Behavior normal.         Thought Content: Thought content normal.        POCT today: No results found for this visit on 10/17/24.     Assessment & Plan  Acute serous otitis media of left ear, recurrence not specified  Physical exam reveals bulging, non-erythematous left TM and enlarged left supraclavicular lymph nodes.  Start Zpak.  Start Flonase to facilitate eustachian tube drainage.  Reviewed how/when to use this medication.  Verbalized understanding.  Let us know if symptoms persist or fail to completely resolve to baseline with current treatment. Verbalized understanding.  Orders:    azithromycin (Zithromax) 250 mg tablet; Take 2 tablets (500 mg) by mouth once daily for 1 day, THEN 1 tablet (250 mg) once daily for 4 days. Take 2 tabs (500 mg) by mouth today, than 1 daily for 4 days..    fluticasone (Flonase) 50 mcg/actuation nasal spray; Administer 1 spray into each nostril once daily. Shake gently. Before first use, prime pump. After use, clean tip and replace cap.    Reviewed treatment options and health maintenance. Take medications as prescribed. We will contact you with the results of any ordered testing; please send a famPlus message or call the office if you do not hear from us. Follow up in the office as discussed. Return sooner if symptoms do not resolve as expected.     Yahaira Redd, APRN-CNP, DNP, CCRN  Family Nurse Practitioner  [facility]

## 2024-11-07 DIAGNOSIS — M54.50 CHRONIC LOW BACK PAIN, UNSPECIFIED BACK PAIN LATERALITY, UNSPECIFIED WHETHER SCIATICA PRESENT: ICD-10-CM

## 2024-11-07 DIAGNOSIS — M15.0 PRIMARY OSTEOARTHRITIS INVOLVING MULTIPLE JOINTS: ICD-10-CM

## 2024-11-07 DIAGNOSIS — G89.29 CHRONIC LOW BACK PAIN, UNSPECIFIED BACK PAIN LATERALITY, UNSPECIFIED WHETHER SCIATICA PRESENT: ICD-10-CM

## 2024-11-07 RX ORDER — OXYCODONE HYDROCHLORIDE 10 MG/1
10 TABLET ORAL EVERY 8 HOURS PRN
Qty: 90 TABLET | Refills: 0 | Status: SHIPPED | OUTPATIENT
Start: 2024-11-07 | End: 2024-12-07

## 2024-11-07 NOTE — TELEPHONE ENCOUNTER
Rx Controlled Refill Request Telephone Encounter    Name: Charo Burgess  :  1970    oxyCODONE (Roxicodone) 10 mg immediate release tablet [    Dose: 10 mg Route: oral Frequency: Every 8 hours PRN for moderate pain (4 - 6), severe pain (7 - 10)   Dispense Quantity: 90 tablet Refills: 0    Note to Pharmacy: 30 DAY SUPPLY         Sig: Take 1 tablet (10 mg) by mouth every 8 hours if needed for moderate pain (4 - 6) or severe pain (7 - 10).     ALLERGIES:    SEE LIST    LAST DRUG SCREEN:     2024  LAST MED CONTRACT:    2024    Specific Pharmacy location:    [address]    Date of last appointment:    10/11/2024  Date of next appointment:    NONE    Best number to reach patient:    [phone]

## 2024-11-29 ASSESSMENT — ENCOUNTER SYMPTOMS
ABDOMINAL PAIN: 1
DIZZINESS: 0
UNEXPECTED WEIGHT CHANGE: 0
HEADACHES: 0
DIFFICULTY URINATING: 0
SHORTNESS OF BREATH: 0
WHEEZING: 0
CHILLS: 0
COLOR CHANGE: 0
SPEECH DIFFICULTY: 0
COUGH: 0
TROUBLE SWALLOWING: 0
FEVER: 0
JOINT SWELLING: 0
NAUSEA: 0
ABDOMINAL DISTENTION: 0
LIGHT-HEADEDNESS: 0
SLEEP DISTURBANCE: 0
VOMITING: 0
DIARRHEA: 0
ARTHRALGIAS: 0
CONFUSION: 0
CONSTIPATION: 0

## 2024-12-03 DIAGNOSIS — G89.29 CHRONIC LOW BACK PAIN, UNSPECIFIED BACK PAIN LATERALITY, UNSPECIFIED WHETHER SCIATICA PRESENT: ICD-10-CM

## 2024-12-03 DIAGNOSIS — M54.50 CHRONIC LOW BACK PAIN, UNSPECIFIED BACK PAIN LATERALITY, UNSPECIFIED WHETHER SCIATICA PRESENT: ICD-10-CM

## 2024-12-03 DIAGNOSIS — M15.0 PRIMARY OSTEOARTHRITIS INVOLVING MULTIPLE JOINTS: ICD-10-CM

## 2024-12-03 RX ORDER — OXYCODONE HYDROCHLORIDE 10 MG/1
10 TABLET ORAL EVERY 8 HOURS PRN
Qty: 90 TABLET | Refills: 0 | Status: SHIPPED | OUTPATIENT
Start: 2024-12-03 | End: 2025-01-02

## 2024-12-03 NOTE — TELEPHONE ENCOUNTER
Rx Controlled Refill Request Telephone Encounter    Name: Charo Burgess  :  1970    Medication Name:   OXYCODONE  Dose (Optional):   10 MG  Quantity (Optional):   90  Directions (Optional):   1 TABLET BY MOUTH EVERY 8 HOURS PRN FOR PAIN    Medication Name:   AMBIEN  Dose (Optional):   5 MG  Quantity (Optional):   30  Directions (Optional):   1 TABLET AT BEDTIME    ALLERGIES:    ON FILE    LAST DRUG SCREEN/MED CONTRACT:     24    Specific Pharmacy location:    [facility].    Date of last appointment:    10/11/24  Date of next appointment:    NONE    Best number to reach patient:    [phone]

## 2025-01-02 DIAGNOSIS — F51.01 PRIMARY INSOMNIA: ICD-10-CM

## 2025-01-02 DIAGNOSIS — G89.29 CHRONIC LOW BACK PAIN, UNSPECIFIED BACK PAIN LATERALITY, UNSPECIFIED WHETHER SCIATICA PRESENT: ICD-10-CM

## 2025-01-02 DIAGNOSIS — M15.0 PRIMARY OSTEOARTHRITIS INVOLVING MULTIPLE JOINTS: ICD-10-CM

## 2025-01-02 DIAGNOSIS — M54.50 CHRONIC LOW BACK PAIN, UNSPECIFIED BACK PAIN LATERALITY, UNSPECIFIED WHETHER SCIATICA PRESENT: ICD-10-CM

## 2025-01-02 RX ORDER — OXYCODONE HYDROCHLORIDE 10 MG/1
10 TABLET ORAL EVERY 8 HOURS PRN
Qty: 90 TABLET | Refills: 0 | Status: SHIPPED | OUTPATIENT
Start: 2025-01-02 | End: 2025-02-01

## 2025-01-02 RX ORDER — ZOLPIDEM TARTRATE 5 MG/1
5 TABLET ORAL NIGHTLY PRN
Qty: 30 TABLET | Refills: 0 | Status: SHIPPED | OUTPATIENT
Start: 2025-01-02

## 2025-01-02 NOTE — TELEPHONE ENCOUNTER
MEDICATION PENDED  Patient scheduled an appointment for 25. Requesting short supplies on 2 RXS  Aware you are out of the office today    Rx Controlled Refill Request Telephone Encounter    Name: Charo Burgess  :  1970    Medication Name:   Oxycodone   Dose (Optional):   10 mg  Quantity (Optional):     Directions (Optional):   Take 1 tablet (10 mg) by mouth every 8 hours prn for moderate pain (4-6) or severe pain (7-10)    Medication Name:   Ambien  Dose (Optional):   5 mg  Quantity (Optional):     Directions (Optional):   Take 1 tablet (5 mg) by mouth prn at bedtime for sleep     ALLERGIES:    see list     LAST DRUG SCREEN/MED CONTRACT:     24    Specific Pharmacy location:    [facility] and [address] in [place]     Date of last appointment:    10/11/24  Date of next appointment:    25    Best number to reach patient:    [phone]

## 2025-01-06 ENCOUNTER — APPOINTMENT (OUTPATIENT)
Dept: PRIMARY CARE | Facility: CLINIC | Age: 55
End: 2025-01-06
Payer: COMMERCIAL

## 2025-01-06 VITALS
BODY MASS INDEX: 29.73 KG/M2 | RESPIRATION RATE: 18 BRPM | SYSTOLIC BLOOD PRESSURE: 120 MMHG | HEIGHT: 66 IN | WEIGHT: 185 LBS | DIASTOLIC BLOOD PRESSURE: 80 MMHG

## 2025-01-06 DIAGNOSIS — E78.49 OTHER HYPERLIPIDEMIA: ICD-10-CM

## 2025-01-06 DIAGNOSIS — F51.01 PRIMARY INSOMNIA: ICD-10-CM

## 2025-01-06 DIAGNOSIS — M54.50 CHRONIC LOW BACK PAIN, UNSPECIFIED BACK PAIN LATERALITY, UNSPECIFIED WHETHER SCIATICA PRESENT: ICD-10-CM

## 2025-01-06 DIAGNOSIS — F11.20 CONTINUOUS OPIOID DEPENDENCE (MULTI): ICD-10-CM

## 2025-01-06 DIAGNOSIS — M15.0 PRIMARY OSTEOARTHRITIS INVOLVING MULTIPLE JOINTS: Primary | ICD-10-CM

## 2025-01-06 DIAGNOSIS — Z51.81 THERAPEUTIC DRUG MONITORING: ICD-10-CM

## 2025-01-06 DIAGNOSIS — C56.9 MALIGNANT NEOPLASM OF OVARY, UNSPECIFIED LATERALITY (MULTI): ICD-10-CM

## 2025-01-06 DIAGNOSIS — G89.29 CHRONIC LOW BACK PAIN, UNSPECIFIED BACK PAIN LATERALITY, UNSPECIFIED WHETHER SCIATICA PRESENT: ICD-10-CM

## 2025-01-06 PROCEDURE — 3008F BODY MASS INDEX DOCD: CPT | Performed by: FAMILY MEDICINE

## 2025-01-06 PROCEDURE — 1036F TOBACCO NON-USER: CPT | Performed by: FAMILY MEDICINE

## 2025-01-06 PROCEDURE — 99214 OFFICE O/P EST MOD 30 MIN: CPT | Performed by: FAMILY MEDICINE

## 2025-01-06 NOTE — PROGRESS NOTES
"Subjective   Patient ID: Charo Burgess is a 54 y.o. female who presents for Pain and Insomnia.  HPI    ** Patient admits to using [employer] products on 1/4/25 **    Patient presents for chronic pain. Is currently taking oxycodone. Rates the pain a 8/10 over the past 7 days. Reports that the medication gives 80% pain control/relief. OARRS reviewed today, CSA 1/6/25. Last took 1/3/25.    Patient in office being seen for a follow up on Insomnia. Currently taking Ambien. Last took last night. Medication agreement signed on 1/6/25. Reports that the medication is working 10/10. 100% managed with the medication. States there are no adverse effects.     Taking current medications which were reviewed.  Problem list discussed.    Overall doing well.  Eating okay.  Staying active.    Has no other new problem /question.     ROS  Constitutional- No activity change. No appetite change.  Eyes- Denies vision changes.  Respiratory- No shortness of breath.  Cardiovascular- No palpitations. No chest pain.  GI- No nausea or vomiting. No diarrhea or constipation. Denies abdominal pain.  Musculoskeletal- Denies joint swelling.  Extremities- No edema.  Neurological- Denies headaches. Denies dizziness.  Skin- No rashes.  Psychiatric/Behavioral- Denies significant anxiety, or depressed mood.     Objective     /80   Resp 18   Ht 1.676 m (5' 6\")   Wt 83.9 kg (185 lb)   LMP  (LMP Unknown)   BMI 29.86 kg/m²     Allergies   Allergen Reactions    Codeine Hives       Constitutional-- Well-nourished.  No distress  Head- unremarkable.  Eyes- PERRL.  Conjunctiva normal.  Nose- Normal.  No rhinorrhea noted.  Throat- Oropharynx is clear and moist.  Neck- Supple with no thyromegaly.  No significant cervical adenopathy noted.  Pulmonary/Chest- Breath sounds normal with normal effort.  No wheezing.  Heart- Regular rate and rhythm.  No murmur.  Abdomen- Soft and non-tender.  No masses noted.  Musculoskeletal- Normal ROM.  No significant joint " swelling  Extremities- No edema.   Neurological- Alert.  No noted deficits.  Skin- Warm.  No rashes.  Psychiatric/Behavioral- Mood and affect normal.  Behavior normal.     Assessment/Plan   1. Primary osteoarthritis involving multiple joints        2. Chronic low back pain, unspecified back pain laterality, unspecified whether sciatica present        3. Primary insomnia        4. Other hyperlipidemia        5. Continuous opioid dependence (Multi)        6. Therapeutic drug monitoring  CANCELED: Opiate/Opioid/Benzo Prescription Compliance      7. Malignant neoplasm of ovary, unspecified laterality (Multi)               Long talk. Treatment options reviewed.  Spent 30 minutes with the patient, with at least 1/2 of the time spent in counseling and instruction.    Reviewed most recent lab work with patient. Advised patient to remain up to date on routine maintenance and health screening.  Maintain appointments with specialists as scheduled.  Advised patient to remain up to date on immunizations.     Osteoarthritis controlled. Educated the patient on osteoarthritis care and management. Educated on muscle strength and exercise.  Understands to use least amount of pain medication to control her symptoms    Educated on insomnia and sleep hygiene.    Carcinoma in remission  Insomnia good control.    Discussed importance of natural sources of nutrition.  Advised patient to consume vegetables, salads, fruits, nuts, and proteins such as fish and chicken.  Discussed portion control.      Discussed the importance of routine stretching and exercise.     Continue and take your medications as prescribed.    Health Maintenance issues discussed.    Importance of healthy diet and regular exercise regimen discussed.    Will obtain urine drug screen at her next visit.  Patient counseled on products containing THC.  She was encouraged to take it by family as she had run out of her pain medication over the weekend due to the pharmacy not  be able to get it in for her  Follow-up as instructed or sooner if any problems or symptoms do not resolve as expected.      Scribe Attestation  By signing my name below, I, Alfredo Thompson   attest that this documentation has been prepared under the direction and in the presence of Sanjiv Dobson MD.

## 2025-02-03 DIAGNOSIS — M15.0 PRIMARY OSTEOARTHRITIS INVOLVING MULTIPLE JOINTS: ICD-10-CM

## 2025-02-03 DIAGNOSIS — G89.29 CHRONIC LOW BACK PAIN, UNSPECIFIED BACK PAIN LATERALITY, UNSPECIFIED WHETHER SCIATICA PRESENT: ICD-10-CM

## 2025-02-03 DIAGNOSIS — M54.50 CHRONIC LOW BACK PAIN, UNSPECIFIED BACK PAIN LATERALITY, UNSPECIFIED WHETHER SCIATICA PRESENT: ICD-10-CM

## 2025-02-03 DIAGNOSIS — F51.01 PRIMARY INSOMNIA: ICD-10-CM

## 2025-02-03 RX ORDER — ZOLPIDEM TARTRATE 5 MG/1
5 TABLET ORAL NIGHTLY PRN
Qty: 30 TABLET | Refills: 0 | Status: SHIPPED | OUTPATIENT
Start: 2025-02-03

## 2025-02-03 RX ORDER — OXYCODONE HYDROCHLORIDE 10 MG/1
10 TABLET ORAL EVERY 8 HOURS PRN
Qty: 90 TABLET | Refills: 0 | Status: SHIPPED | OUTPATIENT
Start: 2025-02-03 | End: 2025-03-05

## 2025-02-03 NOTE — TELEPHONE ENCOUNTER
Rx Refill Request Telephone Encounter    Name:  Charo Burgess  :  [id number]  oxyCODONE (Roxicodone) 10 mg immediate release tablet [[id number]]      Order Details  Dose: 10 mg Route: oral Frequency: Every 8 hours PRN for moderate pain (4 - 6), severe pain (7 - 10)   Dispense Quantity: 90 tablet Refills: 0    Note to Pharmacy: 30 DAY SUPPLY.  [place] pills only.  Okay to refill and replace the prescription she received today.         Sig: Take 1 tablet (10 mg) by mouth every 8 hours if needed for moderate pain (4 - 6) or severe pain (7 - 10)   zolpidem (Ambien) 5 mg tablet [[id number]]    Order Details  Dose: 5 mg Route: oral Frequency: Nightly PRN for sleep   Dispense Quantity: 30 tablet Refills: 0    Note to Pharmacy: *30 DAY SUPPLY*         Sig: Take 1 tablet (5 mg) by mouth as needed at bedtime for sleep.     Specific Pharmacy location:    [facility] #[id number] [place] [address] [address] [address]  Phone: [phone]  Fax: [phone]  ELISABET #: [id number]     Date of last appointment:  25  Date of next appointment:  25  Best number to reach patient:  [phone]

## 2025-02-18 ENCOUNTER — APPOINTMENT (OUTPATIENT)
Dept: PRIMARY CARE | Facility: CLINIC | Age: 55
End: 2025-02-18
Payer: COMMERCIAL

## 2025-02-24 ENCOUNTER — APPOINTMENT (OUTPATIENT)
Dept: PRIMARY CARE | Facility: CLINIC | Age: 55
End: 2025-02-24
Payer: COMMERCIAL

## 2025-03-03 DIAGNOSIS — M54.50 CHRONIC LOW BACK PAIN, UNSPECIFIED BACK PAIN LATERALITY, UNSPECIFIED WHETHER SCIATICA PRESENT: ICD-10-CM

## 2025-03-03 DIAGNOSIS — M15.0 PRIMARY OSTEOARTHRITIS INVOLVING MULTIPLE JOINTS: ICD-10-CM

## 2025-03-03 DIAGNOSIS — G89.29 CHRONIC LOW BACK PAIN, UNSPECIFIED BACK PAIN LATERALITY, UNSPECIFIED WHETHER SCIATICA PRESENT: ICD-10-CM

## 2025-03-03 RX ORDER — OXYCODONE HYDROCHLORIDE 10 MG/1
10 TABLET ORAL EVERY 8 HOURS PRN
Qty: 90 TABLET | Refills: 0 | Status: SHIPPED | OUTPATIENT
Start: 2025-03-03 | End: 2025-04-02

## 2025-03-03 NOTE — TELEPHONE ENCOUNTER
Rx Refill Request Telephone Encounter    Oxycodone Agreement & UDS 25   Ambien Agreement 25     Name:  Charo Burgess  :  [id number]  zolpidem (Ambien) 5 mg tablet [[id number]]    Order Details  Dose: 5 mg Route: oral Frequency: Nightly PRN for sleep   Dispense Quantity: 30 tablet Refills: 0    Note to Pharmacy: *30 DAY SUPPLY*         Sig: Take 1 tablet (5 mg) by mouth as needed at bedtime for sleep.   oxyCODONE (Roxicodone) 10 mg immediate release tablet [[id number]]    Order Details  Dose: 10 mg Route: oral Frequency: Every 8 hours PRN for moderate pain (4 - 6), severe pain (7 - 10)   Dispense Quantity: 90 tablet Refills: 0    Note to Pharmacy: 30 DAY SUPPLY.  [place] pills only.  Okay to refill and replace the prescription she received today.         Sig: Take 1 tablet (10 mg) by mouth every 8 hours if needed for moderate pain (4 - 6) or severe pain (7 - 10)     Specific Pharmacy location:    [facility] #[id number] [address] [facility] [address]  Phone: [phone]  Fax: [phone]  ELISABET #: [id number]     Date of last appointment:  25  Date of next appointment:  25  Best number to reach patient:  [phone]

## 2025-04-01 DIAGNOSIS — F51.01 PRIMARY INSOMNIA: ICD-10-CM

## 2025-04-01 DIAGNOSIS — M54.50 CHRONIC LOW BACK PAIN, UNSPECIFIED BACK PAIN LATERALITY, UNSPECIFIED WHETHER SCIATICA PRESENT: ICD-10-CM

## 2025-04-01 DIAGNOSIS — M15.0 PRIMARY OSTEOARTHRITIS INVOLVING MULTIPLE JOINTS: ICD-10-CM

## 2025-04-01 DIAGNOSIS — G89.29 CHRONIC LOW BACK PAIN, UNSPECIFIED BACK PAIN LATERALITY, UNSPECIFIED WHETHER SCIATICA PRESENT: ICD-10-CM

## 2025-04-01 RX ORDER — OXYCODONE HYDROCHLORIDE 10 MG/1
10 TABLET ORAL EVERY 8 HOURS PRN
Qty: 90 TABLET | Refills: 0 | Status: SHIPPED | OUTPATIENT
Start: 2025-04-01 | End: 2025-05-01

## 2025-04-01 RX ORDER — ZOLPIDEM TARTRATE 5 MG/1
5 TABLET ORAL NIGHTLY PRN
Qty: 30 TABLET | Refills: 0 | Status: SHIPPED | OUTPATIENT
Start: 2025-04-01

## 2025-04-01 NOTE — TELEPHONE ENCOUNTER
MEDICATION PENDED  Rx Controlled Refill Request Telephone Encounter    Name: Charo Burgess  :  1970    Medication Name:   OXYCODONE  Dose (Optional):   10 MG  Quantity (Optional):   90  Directions (Optional):   1 TABLET EVERY 8 HOURS PRN FOR PAIN    Medication Name:   ZOLPIDEM  Dose (Optional):   5 MG  Quantity (Optional):   30  Directions (Optional):   1 TABLET AT BEDTIME    ALLERGIES:    ON FILE    LAST DRUG SCREEN/MED CONTRACT:     25    Specific Pharmacy location:    [facility]    Date of last appointment:    25  Date of next appointment:    25    Best number to reach patient:    [phone]

## 2025-04-07 ENCOUNTER — APPOINTMENT (OUTPATIENT)
Dept: PRIMARY CARE | Facility: CLINIC | Age: 55
End: 2025-04-07
Payer: COMMERCIAL

## 2025-04-07 VITALS
SYSTOLIC BLOOD PRESSURE: 120 MMHG | RESPIRATION RATE: 18 BRPM | BODY MASS INDEX: 29.57 KG/M2 | DIASTOLIC BLOOD PRESSURE: 70 MMHG | HEIGHT: 66 IN | WEIGHT: 184 LBS

## 2025-04-07 DIAGNOSIS — M25.521 RIGHT ELBOW PAIN: ICD-10-CM

## 2025-04-07 DIAGNOSIS — Z51.81 THERAPEUTIC DRUG MONITORING: ICD-10-CM

## 2025-04-07 DIAGNOSIS — F51.01 PRIMARY INSOMNIA: ICD-10-CM

## 2025-04-07 DIAGNOSIS — G89.29 CHRONIC LOW BACK PAIN, UNSPECIFIED BACK PAIN LATERALITY, UNSPECIFIED WHETHER SCIATICA PRESENT: ICD-10-CM

## 2025-04-07 DIAGNOSIS — M15.0 PRIMARY OSTEOARTHRITIS INVOLVING MULTIPLE JOINTS: Primary | ICD-10-CM

## 2025-04-07 DIAGNOSIS — E78.49 OTHER HYPERLIPIDEMIA: ICD-10-CM

## 2025-04-07 DIAGNOSIS — M54.50 CHRONIC LOW BACK PAIN, UNSPECIFIED BACK PAIN LATERALITY, UNSPECIFIED WHETHER SCIATICA PRESENT: ICD-10-CM

## 2025-04-07 DIAGNOSIS — E78.00 HYPERCHOLESTEROLEMIA: ICD-10-CM

## 2025-04-07 DIAGNOSIS — C56.9 MALIGNANT NEOPLASM OF OVARY, UNSPECIFIED LATERALITY (MULTI): ICD-10-CM

## 2025-04-07 DIAGNOSIS — G62.0 DRUG-INDUCED POLYNEUROPATHY (MULTI): ICD-10-CM

## 2025-04-07 PROCEDURE — 99213 OFFICE O/P EST LOW 20 MIN: CPT | Performed by: FAMILY MEDICINE

## 2025-04-07 PROCEDURE — 3008F BODY MASS INDEX DOCD: CPT | Performed by: FAMILY MEDICINE

## 2025-04-07 PROCEDURE — 1036F TOBACCO NON-USER: CPT | Performed by: FAMILY MEDICINE

## 2025-04-07 NOTE — PROGRESS NOTES
"Subjective   Patient ID: Charo Burgess is a 54 y.o. female who presents for Insomnia, Pain, and Elbow Pain.  HPI    Patient presents for chronic pain. Is currently taking oxycodone. Rates the pain a 8/10 over the past 7 days. Reports that the medication gives 80% pain control/relief. OARRS reviewed today, CSA 1/6/25. Last took this morning.     Patient in office being seen for a follow up on Insomnia. Currently taking Ambien. Last took last night. Medication agreement signed on 1/6/25. Reports that the medication is working 10/10. 100% managed with the medication. States there are no adverse effects.     Patient would like to know if she can get a referral for orthopedics for her right elbow. Patient states that she did go to [facility] but does not want to see them again. States that she tore something in her elbow.     Taking current medications which were reviewed.  Problem list discussed.    Overall doing well.  Eating okay.  Staying active.    Has no other new problem /question.     ROS  Constitutional- No activity change. No appetite change.  Eyes- Denies vision changes.  Respiratory- No shortness of breath.  Cardiovascular- No palpitations. No chest pain.  GI- No nausea or vomiting. No diarrhea or constipation. Denies abdominal pain.  Musculoskeletal- myalgias  Neurological- Denies headaches. Denies dizziness.  Skin- No rashes.  Psychiatric/Behavioral- Denies significant anxiety, or depressed mood.     Objective     /70   Resp 18   Ht 1.676 m (5' 6\")   Wt 83.5 kg (184 lb)   LMP  (LMP Unknown)   BMI 29.70 kg/m²     Allergies   Allergen Reactions    Codeine Hives       Constitutional-- Well-nourished.  No distress  Head- unremarkable.  Eyes- PERRL.  Conjunctiva normal.  Nose- Normal.  No rhinorrhea noted.  Throat- Oropharynx is clear and moist.  Neck- Supple with no thyromegaly.  No significant cervical adenopathy noted.  Pulmonary/Chest- Breath sounds normal with normal effort.  No " wheezing.  Heart- Regular rate and rhythm.  No murmur.  Abdomen- Soft and non-tender.  No masses noted.  Musculoskeletal-OA changes hands and knees noted.  Chronic low back pain exacerbated with range of motion.  Moderate elbow pain with range of motion.  Extremities- No edema.   Neurological- Alert.  No noted deficits.  Skin- Warm.  No rashes.  Psychiatric/Behavioral- Mood and affect normal.  Behavior normal.     Assessment/Plan   1. Primary osteoarthritis involving multiple joints        2. Chronic low back pain, unspecified back pain laterality, unspecified whether sciatica present        3. Primary insomnia        4. Other hyperlipidemia        5. Hypercholesterolemia  Lipid Panel    Lipid Panel      6. Right elbow pain  Referral to Orthopaedic Surgery      7. Malignant neoplasm of ovary, unspecified laterality (Multi)  CBC and Auto Differential    Comprehensive Metabolic Panel    CBC and Auto Differential    Comprehensive Metabolic Panel      8. Drug-induced polyneuropathy (Multi)        9. Therapeutic drug monitoring  Opiate/Opioid/Benzo Prescription Compliance             Long talk. Treatment options reviewed.    Reviewed most recent lab work with patient. Advised patient to remain up to date on routine maintenance and health screening.  Maintain appointments with specialists as scheduled.      Discussed right sided elbow pain. Referral placed with Orthopaedic surgery.      Discussed low back pain. Discussed Osteoarthritis. Educated the patient on osteoarthritis care and management. Educated on muscle strength and exercise. Patient understands to take the least amount of pain medication in order to control symptoms.     Educated on insomnia and sleep hygiene.    Discussed the importance of routine stretching and exercise.     Complete blood work as discussed. Advised patient to remain properly hydrated.     Continue and take your medications as prescribed.    Health Maintenance issues discussed.    Importance  of healthy diet and regular exercise regimen discussed.    We will contact you with any test results ordered. If you do not hear from us, please contact.    Follow-up as instructed or sooner if any problems or symptoms do not resolve as expected.      Scribe Attestation  By signing my name below, Katelin LEGER Scribe   attest that this documentation has been prepared under the direction and in the presence of Sanjiv Dobson MD.

## 2025-04-11 LAB
1OH-MIDAZOLAM UR-MCNC: NEGATIVE NG/ML
7AMINOCLONAZEPAM UR-MCNC: NEGATIVE NG/ML
A-OH ALPRAZ UR-MCNC: NEGATIVE NG/ML
A-OH-TRIAZOLAM UR-MCNC: NEGATIVE NG/ML
AMPHETAMINES UR QL: NEGATIVE NG/ML
BARBITURATES UR QL: NEGATIVE NG/ML
BZE UR QL: NEGATIVE NG/ML
CODEINE UR-MCNC: NEGATIVE NG/ML
CREAT UR-MCNC: 125.6 MG/DL
DRUG SCREEN COMMENT UR-IMP: ABNORMAL
EDDP UR-MCNC: NEGATIVE NG/ML
FENTANYL UR-MCNC: NEGATIVE NG/ML
HYDROCODONE UR-MCNC: NEGATIVE NG/ML
HYDROMORPHONE UR-MCNC: NEGATIVE NG/ML
LORAZEPAM UR-MCNC: NEGATIVE NG/ML
METHADONE UR-MCNC: NEGATIVE NG/ML
MORPHINE UR-MCNC: NEGATIVE NG/ML
NORDIAZEPAM UR-MCNC: NEGATIVE NG/ML
NORFENTANYL UR-MCNC: NEGATIVE NG/ML
NORHYDROCODONE UR CFM-MCNC: NEGATIVE NG/ML
NOROXYCODONE UR CFM-MCNC: 2080 NG/ML
NORTRAMADOL UR-MCNC: NEGATIVE NG/ML
OH-ETHYLFLURAZ UR-MCNC: NEGATIVE NG/ML
OXAZEPAM UR-MCNC: NEGATIVE NG/ML
OXIDANTS UR QL: NEGATIVE MCG/ML
OXYCODONE UR CFM-MCNC: 282 NG/ML
OXYMORPHONE UR CFM-MCNC: 813 NG/ML
PCP UR QL: NEGATIVE NG/ML
PH UR: 6.5 [PH] (ref 4.5–9)
QUEST 6 ACETYLMORPHINE: NEGATIVE NG/ML
QUEST NOTES AND COMMENTS: ABNORMAL
QUEST ZOLPIDEM: NEGATIVE NG/ML
TEMAZEPAM UR-MCNC: NEGATIVE NG/ML
THC UR QL: NEGATIVE NG/ML
TRAMADOL UR-MCNC: NEGATIVE NG/ML
ZOLPIDEM PHENYL-4-CARB UR CFM-MCNC: NEGATIVE NG/ML

## 2025-04-18 ENCOUNTER — OFFICE VISIT (OUTPATIENT)
Dept: ORTHOPEDIC SURGERY | Facility: CLINIC | Age: 55
End: 2025-04-18
Payer: COMMERCIAL

## 2025-04-18 DIAGNOSIS — M77.11 LATERAL EPICONDYLITIS OF RIGHT ELBOW: Primary | ICD-10-CM

## 2025-04-18 DIAGNOSIS — M25.521 RIGHT ELBOW PAIN: ICD-10-CM

## 2025-04-18 PROCEDURE — 20551 NJX 1 TENDON ORIGIN/INSJ: CPT | Mod: RT | Performed by: STUDENT IN AN ORGANIZED HEALTH CARE EDUCATION/TRAINING PROGRAM

## 2025-04-18 PROCEDURE — 2500000004 HC RX 250 GENERAL PHARMACY W/ HCPCS (ALT 636 FOR OP/ED): Performed by: STUDENT IN AN ORGANIZED HEALTH CARE EDUCATION/TRAINING PROGRAM

## 2025-04-18 PROCEDURE — 99214 OFFICE O/P EST MOD 30 MIN: CPT | Mod: 25 | Performed by: STUDENT IN AN ORGANIZED HEALTH CARE EDUCATION/TRAINING PROGRAM

## 2025-04-18 RX ORDER — LIDOCAINE HYDROCHLORIDE 10 MG/ML
1 INJECTION, SOLUTION INFILTRATION; PERINEURAL
Status: COMPLETED | OUTPATIENT
Start: 2025-04-18 | End: 2025-04-18

## 2025-04-18 RX ADMIN — TRIAMCINOLONE ACETONIDE 10 MG: 10 INJECTION, SUSPENSION INTRA-ARTICULAR; INTRALESIONAL at 10:23

## 2025-04-18 RX ADMIN — LIDOCAINE HYDROCHLORIDE 1 ML: 10 INJECTION, SOLUTION INFILTRATION; PERINEURAL at 10:23

## 2025-04-18 NOTE — PROGRESS NOTES
History of Present Illness:  Presents with right elbow pain.  The symptoms have been present for months. The patient denies any inciting trauma. The pain is localized about the lateral aspect of the elbow. It is described as moderate. The pain occurs intermittantly and is worse with gripping and lifting activities. The patient presents due to persistent symptoms even with activity modification.  Patient was seen previously by [facility] and diagnosed with lateral epicondylitis.      Review of Systems   GENERAL: Negative for malaise, significant weight loss, fever  MUSCULOSKELETAL: see HPI  NEURO:  Negative    The patient's past medical history, family history, social history, and review of systems were reviewed. History is otherwise negative except as stated in the HPI.    Physical Examination:  General: Alert and oriented to person, place, and time.  No acute distress and breathing comfortably: Pleasant and cooperative with examination.  HEENT: Head is normocephalic and atraumatic.  Neck: Supple, no visible swelling.  Cardiovascular: No palpable tachycardia  Lungs: No audible wheezing or labored breathing  Abdomen: Nondistended.  On musculoskeletal examination, the patient has full elbow range of motion. There is no obvious instability with varus and valgus testing. There is tenderness to palpation about the lateral epicondyle. There is no tenderness to palpation medially, anteriorly, or posteriorly. Pain is illicited with resisted wrist extension and forearm supination. Pain is worse with elbow extension and wrist flexion. Provocative maneuvers over the cubital tunnel are negative.  In regards to the wrist, there is no obvious deformity. Range of motion is full in flexion, extension, pronation, supination, and radial/ulnar deviation. There is no tenderness to palpation. Sensation and motor function are intact in the radial, ulnar, and median nerve distribution. There is no obvious thenar or intrinsic  atrophy. All fingers are without triggering. The patient can make a full composite fist. The hand itself is warm and well perfused. The skin is intact throughout. The contralateral hand and wrist are normal to inspection, range of motion, stability, and strength.    Imaging:  None    Hand / UE Inj/Asp: R elbow for lateral epicondylitis on 4/18/2025 10:23 AM  Indications: pain  Details: 24 G needle, lateral approach  Medications: 10 mg triamcinolone acetonide 10 mg/mL; 1 mL lidocaine 10 mg/mL (1 %)  Outcome: tolerated well, no immediate complications  Procedure, treatment alternatives, risks and benefits explained, specific risks discussed. Immediately prior to procedure a time out was called to verify the correct patient, procedure, equipment, support staff and site/side marked as required.             Assessment:  Patient with right lateral epicondylitis.  Discussed at length with the patient today we will perform cortisone injection and send her to physical therapy for range of motion and strength.    Plan:  I had a long discussion with the patient regarding the diagnosis of lateral epicondylitis and its treatment. I explained that it is a self-limiting condition that tends to resolve spontaneously.   Unfortunately, no treatments have been proven to alter the natural history of this condition. Moreover, it can take a relatively long time for symptom resolution, and therefore, I have preached patience.     I have offered an injection to to persistent debilitating pain. After obtaining consent, I injected a 1mL combination of Kenalog and 1% lidocaine into the lateral epicondyle using standard, sterile technique. The injection site was dressed, and the patient tolerated the injection well.         Ankush Balderas PA-C      In a face to face encounter, I performed a history and physical examination, discussed pertinent diagnostic studies if indicated, and discussed diagnosis and management strategies with both the  patient and the mid-level provider. I reviewed the mid-level's note and agree with the documented findings and plan of care. Addendums and additions to note have been added as appropriate.     Celia Hill MD

## 2025-04-30 DIAGNOSIS — G89.29 CHRONIC LOW BACK PAIN, UNSPECIFIED BACK PAIN LATERALITY, UNSPECIFIED WHETHER SCIATICA PRESENT: ICD-10-CM

## 2025-04-30 DIAGNOSIS — M15.0 PRIMARY OSTEOARTHRITIS INVOLVING MULTIPLE JOINTS: ICD-10-CM

## 2025-04-30 DIAGNOSIS — M54.50 CHRONIC LOW BACK PAIN, UNSPECIFIED BACK PAIN LATERALITY, UNSPECIFIED WHETHER SCIATICA PRESENT: ICD-10-CM

## 2025-04-30 RX ORDER — OXYCODONE HYDROCHLORIDE 10 MG/1
10 TABLET ORAL EVERY 8 HOURS PRN
Qty: 90 TABLET | Refills: 0 | Status: SHIPPED | OUTPATIENT
Start: 2025-04-30 | End: 2025-05-30

## 2025-04-30 NOTE — TELEPHONE ENCOUNTER
Rx Controlled Refill Request Telephone Encounter    Name: Charo Burgess  :  1970    oxyCODONE (Roxicodone) 10 mg immediate release tablet [[id number]]    Order Details  Dose: 10 mg Route: oral Frequency: Every 8 hours PRN for moderate pain (4 - 6), severe pain (7 - 10)   Dispense Quantity: 90 tablet (30 day supply) Refills: 0    Duration: 30 days Dispense As Written: No    Note to Pharmacy: 30 DAY SUPPLY.  [place] pills only.  Okay to refill and replace the prescription she received today.         Sig: Take 1 tablet (10 mg) by mouth every 8 hours if needed for     zolpidem (Ambien) 5 mg tablet [[id number]]    Order Details    Dose: 5 mg Route: oral Frequency: Nightly PRN for sleep   Dispense Quantity: 30 tablet (30 day supply) Refills: 0    Duration: -- Dispense As Written: No    Note to Pharmacy: *30 DAY SUPPLY*         Sig: Take 1 tablet (5 mg) by mouth as needed at bedtime for sleep.           ALLERGIES:    SEE LIST    LAST DRUG SCREEN/MED CONTRACT:     2025    Specific Pharmacy location:    [facility] [place]    Date of last appointment:    2025  Date of next appointment:    2025    Best number to reach patient:    [phone]

## 2025-05-29 DIAGNOSIS — M54.50 CHRONIC LOW BACK PAIN, UNSPECIFIED BACK PAIN LATERALITY, UNSPECIFIED WHETHER SCIATICA PRESENT: ICD-10-CM

## 2025-05-29 DIAGNOSIS — G89.29 CHRONIC LOW BACK PAIN, UNSPECIFIED BACK PAIN LATERALITY, UNSPECIFIED WHETHER SCIATICA PRESENT: ICD-10-CM

## 2025-05-29 DIAGNOSIS — M15.0 PRIMARY OSTEOARTHRITIS INVOLVING MULTIPLE JOINTS: ICD-10-CM

## 2025-05-29 RX ORDER — OXYCODONE HYDROCHLORIDE 10 MG/1
10 TABLET ORAL EVERY 8 HOURS PRN
Qty: 90 TABLET | Refills: 0 | Status: SHIPPED | OUTPATIENT
Start: 2025-05-29 | End: 2025-06-28

## 2025-05-29 NOTE — TELEPHONE ENCOUNTER
Rx Controlled Refill Request Telephone Encounter    Name: Charo Burgess  :  1970    oxyCODONE (Roxicodone) 10 mg immediate release tablet [[id number]]    Order Details  Dose: 10 mg Route: oral Frequency: Every 8 hours PRN for moderate pain (4 - 6), severe pain (7 - 10)   Dispense Quantity: 90 tablet (30 day supply) Refills: 0    Duration: 30 days Dispense As Written: No    Note to Pharmacy: 30 DAY SUPPLY.  [place] pills only.  Okay to refill and replace the prescription she received today.         Sig: Take 1 tablet (10 mg) by mouth every 8 hours if needed for moderate pain (4 - 6) or severe pain (7 - 10     zolpidem (Ambien) 5 mg tablet [[id number]]    Order Details  Dose: 5 mg Route: oral Frequency: Nightly PRN for sleep   Dispense Quantity: 30 tablet (30 day supply) Refills: 0    Duration: -- Dispense As Written: No    Note to Pharmacy: *30 DAY SUPPLY*         Sig: Take 1 tablet (5 mg) by mouth as needed at bedtime for sleep.     ALLERGIES:    SEE LIST    LAST DRUG SCREEN/MED CONTRACT:     25    Specific Pharmacy location:      [id number] [facility] [address]  Phone: [phone]  Fax: [phone]  ELISABET #: [id number]       Date of last appointment:    25  Date of next appointment:    25    Best number to reach patient:    [phone]

## 2025-06-02 ENCOUNTER — APPOINTMENT (OUTPATIENT)
Dept: ORTHOPEDIC SURGERY | Facility: CLINIC | Age: 55
End: 2025-06-02
Payer: COMMERCIAL

## 2025-06-30 DIAGNOSIS — M15.0 PRIMARY OSTEOARTHRITIS INVOLVING MULTIPLE JOINTS: ICD-10-CM

## 2025-06-30 DIAGNOSIS — F51.01 PRIMARY INSOMNIA: ICD-10-CM

## 2025-06-30 DIAGNOSIS — M54.50 CHRONIC LOW BACK PAIN, UNSPECIFIED BACK PAIN LATERALITY, UNSPECIFIED WHETHER SCIATICA PRESENT: ICD-10-CM

## 2025-06-30 DIAGNOSIS — G89.29 CHRONIC LOW BACK PAIN, UNSPECIFIED BACK PAIN LATERALITY, UNSPECIFIED WHETHER SCIATICA PRESENT: ICD-10-CM

## 2025-06-30 RX ORDER — ZOLPIDEM TARTRATE 5 MG/1
5 TABLET ORAL NIGHTLY PRN
Qty: 30 TABLET | Refills: 0 | Status: SHIPPED | OUTPATIENT
Start: 2025-06-30

## 2025-06-30 RX ORDER — OXYCODONE HYDROCHLORIDE 10 MG/1
10 TABLET ORAL EVERY 8 HOURS PRN
Qty: 90 TABLET | Refills: 0 | Status: SHIPPED | OUTPATIENT
Start: 2025-06-30 | End: 2025-07-30

## 2025-06-30 NOTE — TELEPHONE ENCOUNTER
Rx Controlled Refill Request Telephone Encounter    Name: Charo Burgess  :  1970    Medication Name:   ZOLPIDEM  Dose (Optional):   5 MG  Quantity (Optional):   30  Directions (Optional):   1 TABLET DAILY    Medication Name:   OXYCODONE  Dose (Optional):   10 MG  Quantity (Optional):   90  Directions (Optional):   1 TABLET EVERY 8 HOURS PRN FOR PAIN    ALLERGIES:    ON FILE    LAST DRUG SCREEN/MED CONTRACT:     25    [facility]    Date of last appointment:    25  Date of next appointment:    25    Best number to reach patient:    [phone]

## 2025-07-07 ENCOUNTER — APPOINTMENT (OUTPATIENT)
Dept: PRIMARY CARE | Facility: CLINIC | Age: 55
End: 2025-07-07
Payer: COMMERCIAL

## 2025-07-29 DIAGNOSIS — M54.50 CHRONIC LOW BACK PAIN, UNSPECIFIED BACK PAIN LATERALITY, UNSPECIFIED WHETHER SCIATICA PRESENT: ICD-10-CM

## 2025-07-29 DIAGNOSIS — F51.01 PRIMARY INSOMNIA: ICD-10-CM

## 2025-07-29 DIAGNOSIS — G89.29 CHRONIC LOW BACK PAIN, UNSPECIFIED BACK PAIN LATERALITY, UNSPECIFIED WHETHER SCIATICA PRESENT: ICD-10-CM

## 2025-07-29 DIAGNOSIS — M15.0 PRIMARY OSTEOARTHRITIS INVOLVING MULTIPLE JOINTS: ICD-10-CM

## 2025-07-29 RX ORDER — OXYCODONE HYDROCHLORIDE 10 MG/1
10 TABLET ORAL EVERY 8 HOURS PRN
Qty: 45 TABLET | Refills: 0 | Status: SHIPPED | OUTPATIENT
Start: 2025-07-29 | End: 2025-08-28

## 2025-07-29 RX ORDER — ZOLPIDEM TARTRATE 5 MG/1
5 TABLET ORAL NIGHTLY PRN
Qty: 15 TABLET | Refills: 0 | Status: SHIPPED | OUTPATIENT
Start: 2025-07-29

## 2025-07-29 NOTE — TELEPHONE ENCOUNTER
Patient is calling to request 2 RX refills for her Ambien 5 mg and Oxycodone 10 mg  She scheduled an appointment for 25 and requesting a short supply on her Oxycodone until then    Rx Controlled Refill Request Telephone Encounter    Name: Charo Burgess  :  1970    Medication Name:   Ambien  Dose (Optional):   5 mg  Quantity (Optional):   30 tablet (30 day supply)  Directions (Optional):   Take 1 tablet (5 mg) by mouth as needed at bedtime for sleep     Medication Name:   Oxycodone  Dose (Optional):   10 mg  Quantity (Optional):     Directions (Optional):   Take 1 tablet (10 mg) by mouth every 8 hours if needed for moderate pain (4-6) or severe pain (7-10)    ALLERGIES:    see list     LAST DRUG SCREEN/MED CONTRACT:     25    Specific Pharmacy location:    [facility] [address] in [place]    Date of last appointment:    25  Date of next appointment:    25    Best number to reach patient:    [phone]

## 2025-08-14 ENCOUNTER — APPOINTMENT (OUTPATIENT)
Dept: PRIMARY CARE | Facility: CLINIC | Age: 55
End: 2025-08-14
Payer: COMMERCIAL

## 2025-08-14 VITALS
OXYGEN SATURATION: 96 % | DIASTOLIC BLOOD PRESSURE: 74 MMHG | HEIGHT: 66 IN | SYSTOLIC BLOOD PRESSURE: 132 MMHG | WEIGHT: 178 LBS | BODY MASS INDEX: 28.61 KG/M2 | HEART RATE: 76 BPM | TEMPERATURE: 97.6 F

## 2025-08-14 DIAGNOSIS — M15.0 PRIMARY OSTEOARTHRITIS INVOLVING MULTIPLE JOINTS: ICD-10-CM

## 2025-08-14 DIAGNOSIS — C50.912 MALIGNANT NEOPLASM OF LEFT FEMALE BREAST, UNSPECIFIED ESTROGEN RECEPTOR STATUS, UNSPECIFIED SITE OF BREAST: ICD-10-CM

## 2025-08-14 DIAGNOSIS — C56.9 MALIGNANT NEOPLASM OF OVARY, UNSPECIFIED LATERALITY (MULTI): ICD-10-CM

## 2025-08-14 DIAGNOSIS — M54.50 CHRONIC LOW BACK PAIN, UNSPECIFIED BACK PAIN LATERALITY, UNSPECIFIED WHETHER SCIATICA PRESENT: Primary | ICD-10-CM

## 2025-08-14 DIAGNOSIS — M77.9 TENDONITIS: ICD-10-CM

## 2025-08-14 DIAGNOSIS — F51.01 PRIMARY INSOMNIA: ICD-10-CM

## 2025-08-14 DIAGNOSIS — G89.29 CHRONIC LOW BACK PAIN, UNSPECIFIED BACK PAIN LATERALITY, UNSPECIFIED WHETHER SCIATICA PRESENT: Primary | ICD-10-CM

## 2025-08-14 PROCEDURE — 3008F BODY MASS INDEX DOCD: CPT | Performed by: FAMILY MEDICINE

## 2025-08-14 PROCEDURE — 99213 OFFICE O/P EST LOW 20 MIN: CPT | Performed by: FAMILY MEDICINE

## 2025-08-14 RX ORDER — OXYCODONE HYDROCHLORIDE 10 MG/1
10 TABLET ORAL EVERY 8 HOURS PRN
Qty: 90 TABLET | Refills: 0 | Status: SHIPPED | OUTPATIENT
Start: 2025-08-14 | End: 2025-08-14 | Stop reason: SDUPTHER

## 2025-08-14 RX ORDER — METHYLPREDNISOLONE 4 MG/1
TABLET ORAL
Qty: 21 TABLET | Refills: 0 | Status: SHIPPED | OUTPATIENT
Start: 2025-08-14

## 2025-08-14 RX ORDER — MELOXICAM 15 MG/1
15 TABLET ORAL DAILY
Qty: 30 TABLET | Refills: 5 | Status: SHIPPED | OUTPATIENT
Start: 2025-08-14 | End: 2026-02-10

## 2025-08-14 RX ORDER — ZOLPIDEM TARTRATE 5 MG/1
5 TABLET ORAL NIGHTLY PRN
Qty: 30 TABLET | Refills: 5 | Status: SHIPPED | OUTPATIENT
Start: 2025-08-14

## 2025-08-14 RX ORDER — OXYCODONE HYDROCHLORIDE 10 MG/1
10 TABLET ORAL EVERY 4 HOURS PRN
Qty: 100 TABLET | Refills: 0 | Status: SHIPPED | OUTPATIENT
Start: 2025-08-14 | End: 2025-09-13

## 2025-08-14 RX ORDER — ZOLPIDEM TARTRATE 10 MG/1
TABLET ORAL
Qty: 30 TABLET | Refills: 0 | Status: SHIPPED | OUTPATIENT
Start: 2025-08-14

## 2025-08-14 ASSESSMENT — PATIENT HEALTH QUESTIONNAIRE - PHQ9
2. FEELING DOWN, DEPRESSED OR HOPELESS: NOT AT ALL
SUM OF ALL RESPONSES TO PHQ9 QUESTIONS 1 AND 2: 0
1. LITTLE INTEREST OR PLEASURE IN DOING THINGS: NOT AT ALL

## 2025-08-14 ASSESSMENT — ENCOUNTER SYMPTOMS: DEPRESSION: 0

## 2025-11-14 ENCOUNTER — APPOINTMENT (OUTPATIENT)
Dept: PRIMARY CARE | Facility: CLINIC | Age: 55
End: 2025-11-14
Payer: COMMERCIAL